# Patient Record
Sex: MALE | Race: WHITE | Employment: OTHER | URBAN - METROPOLITAN AREA
[De-identification: names, ages, dates, MRNs, and addresses within clinical notes are randomized per-mention and may not be internally consistent; named-entity substitution may affect disease eponyms.]

---

## 2017-01-06 ENCOUNTER — ANTI-COAG VISIT (OUTPATIENT)
Dept: INTERNAL MEDICINE CLINIC | Facility: CLINIC | Age: 82
End: 2017-01-06

## 2017-01-06 DIAGNOSIS — I48.20 CHRONIC ATRIAL FIBRILLATION (HCC): Primary | ICD-10-CM

## 2017-01-06 LAB — INR: 2.3 (ref 2–3)

## 2017-01-06 PROCEDURE — 36416 COLLJ CAPILLARY BLOOD SPEC: CPT

## 2017-01-06 PROCEDURE — 85610 PROTHROMBIN TIME: CPT

## 2017-02-07 ENCOUNTER — ANTI-COAG VISIT (OUTPATIENT)
Dept: INTERNAL MEDICINE CLINIC | Facility: CLINIC | Age: 82
End: 2017-02-07

## 2017-02-07 DIAGNOSIS — I48.20 CHRONIC ATRIAL FIBRILLATION (HCC): Primary | ICD-10-CM

## 2017-02-07 LAB — INR: 1.9 (ref 2–3)

## 2017-02-07 PROCEDURE — 36416 COLLJ CAPILLARY BLOOD SPEC: CPT

## 2017-02-07 PROCEDURE — G0463 HOSPITAL OUTPT CLINIC VISIT: HCPCS

## 2017-02-07 PROCEDURE — 85610 PROTHROMBIN TIME: CPT

## 2017-02-22 ENCOUNTER — OFFICE VISIT (OUTPATIENT)
Dept: FAMILY MEDICINE CLINIC | Facility: CLINIC | Age: 82
End: 2017-02-22

## 2017-02-22 VITALS
TEMPERATURE: 98 F | RESPIRATION RATE: 18 BRPM | WEIGHT: 194 LBS | BODY MASS INDEX: 27.77 KG/M2 | SYSTOLIC BLOOD PRESSURE: 133 MMHG | HEIGHT: 70 IN | HEART RATE: 71 BPM | DIASTOLIC BLOOD PRESSURE: 75 MMHG

## 2017-02-22 DIAGNOSIS — H53.9 VISUAL DISTURBANCE OF ONE EYE: ICD-10-CM

## 2017-02-22 DIAGNOSIS — S00.83XA CONTUSION OF FACE, INITIAL ENCOUNTER: ICD-10-CM

## 2017-02-22 PROCEDURE — 99213 OFFICE O/P EST LOW 20 MIN: CPT | Performed by: FAMILY MEDICINE

## 2017-02-22 PROCEDURE — G0463 HOSPITAL OUTPT CLINIC VISIT: HCPCS | Performed by: FAMILY MEDICINE

## 2017-02-22 RX ORDER — DILTIAZEM HYDROCHLORIDE 180 MG/1
180 CAPSULE, COATED, EXTENDED RELEASE ORAL
Refills: 11 | COMMUNITY
Start: 2017-01-31 | End: 2017-02-22

## 2017-02-22 NOTE — PROGRESS NOTES
HPI:    Patient ID: Karla Dacosta is a 80year old male. HPI Comments: Pt presents after a fall 5 days ago when he missed a step and slipped in his kitchen. Pt hit his face and head on the floor. No LOC but had some swelling/ bruising.  Pt had a small cu no discharge. Right conjunctiva is not injected. Left conjunctiva is not injected. Right pupil is reactive. Left pupil is reactive. Bruising of the area under the eye. No pain to palpation of the periorbital area. Scalp and head non-tender.

## 2017-03-08 ENCOUNTER — ANTI-COAG VISIT (OUTPATIENT)
Dept: INTERNAL MEDICINE CLINIC | Facility: CLINIC | Age: 82
End: 2017-03-08

## 2017-03-08 DIAGNOSIS — I48.20 CHRONIC ATRIAL FIBRILLATION (HCC): Primary | ICD-10-CM

## 2017-03-08 LAB — INR: 2.1 (ref 2–3)

## 2017-03-08 PROCEDURE — 36416 COLLJ CAPILLARY BLOOD SPEC: CPT

## 2017-03-08 PROCEDURE — 85610 PROTHROMBIN TIME: CPT

## 2017-04-06 ENCOUNTER — ANTI-COAG VISIT (OUTPATIENT)
Dept: INTERNAL MEDICINE CLINIC | Facility: CLINIC | Age: 82
End: 2017-04-06

## 2017-04-06 DIAGNOSIS — I48.20 CHRONIC ATRIAL FIBRILLATION (HCC): Primary | ICD-10-CM

## 2017-04-06 PROCEDURE — 36416 COLLJ CAPILLARY BLOOD SPEC: CPT

## 2017-04-06 PROCEDURE — 85610 PROTHROMBIN TIME: CPT

## 2017-04-06 PROCEDURE — G0463 HOSPITAL OUTPT CLINIC VISIT: HCPCS

## 2017-04-20 ENCOUNTER — ANTI-COAG VISIT (OUTPATIENT)
Dept: INTERNAL MEDICINE CLINIC | Facility: CLINIC | Age: 82
End: 2017-04-20

## 2017-04-20 DIAGNOSIS — I48.20 CHRONIC ATRIAL FIBRILLATION (HCC): Primary | ICD-10-CM

## 2017-04-20 PROCEDURE — 36416 COLLJ CAPILLARY BLOOD SPEC: CPT

## 2017-04-20 PROCEDURE — 85610 PROTHROMBIN TIME: CPT

## 2017-04-25 RX ORDER — WARFARIN SODIUM 5 MG/1
TABLET ORAL
Qty: 45 TABLET | Refills: 3 | OUTPATIENT
Start: 2017-04-25

## 2017-04-25 NOTE — TELEPHONE ENCOUNTER
·     Future Appointments       Provider Department Appt Notes    In 3 weeks [address]     In 2 months Lori Wong MD [address] 1 year f/u           Ref Range 4/20/17 11:09 AM

## 2017-05-18 ENCOUNTER — ANTI-COAG VISIT (OUTPATIENT)
Dept: INTERNAL MEDICINE CLINIC | Facility: CLINIC | Age: 82
End: 2017-05-18

## 2017-05-18 DIAGNOSIS — I48.20 CHRONIC ATRIAL FIBRILLATION (HCC): Primary | ICD-10-CM

## 2017-05-18 PROCEDURE — 85610 PROTHROMBIN TIME: CPT

## 2017-05-18 PROCEDURE — 36416 COLLJ CAPILLARY BLOOD SPEC: CPT

## 2017-05-18 PROCEDURE — G0463 HOSPITAL OUTPT CLINIC VISIT: HCPCS

## 2017-05-24 ENCOUNTER — OFFICE VISIT (OUTPATIENT)
Dept: FAMILY MEDICINE CLINIC | Facility: CLINIC | Age: 82
End: 2017-05-24

## 2017-05-24 VITALS
BODY MASS INDEX: 27.77 KG/M2 | TEMPERATURE: 98 F | RESPIRATION RATE: 18 BRPM | HEIGHT: 70 IN | DIASTOLIC BLOOD PRESSURE: 85 MMHG | HEART RATE: 69 BPM | SYSTOLIC BLOOD PRESSURE: 159 MMHG | WEIGHT: 194 LBS

## 2017-05-24 DIAGNOSIS — L29.9 ITCHING OF EAR: ICD-10-CM

## 2017-05-24 PROCEDURE — G0463 HOSPITAL OUTPT CLINIC VISIT: HCPCS | Performed by: FAMILY MEDICINE

## 2017-05-24 PROCEDURE — 99213 OFFICE O/P EST LOW 20 MIN: CPT | Performed by: FAMILY MEDICINE

## 2017-05-24 RX ORDER — CLINDAMYCIN HYDROCHLORIDE 150 MG/1
CAPSULE ORAL
Refills: 0 | Status: ON HOLD | COMMUNITY
Start: 2017-05-22 | End: 2017-06-07

## 2017-05-24 RX ORDER — DILTIAZEM HYDROCHLORIDE 180 MG/1
180 CAPSULE, COATED, EXTENDED RELEASE ORAL
Refills: 11 | Status: ON HOLD | COMMUNITY
Start: 2017-05-02 | End: 2017-06-07

## 2017-05-24 NOTE — PROGRESS NOTES
HPI:    Patient ID: Jeff Arango is a 80year old male. HPI Comments: Pt presents with ear irritation and itching for the last couple days. Pt also had has oral surgery and is on abx after his procedure. Pt has been using an ear drops for this.

## 2017-05-25 ENCOUNTER — OFFICE VISIT (OUTPATIENT)
Dept: OTOLARYNGOLOGY | Facility: CLINIC | Age: 82
End: 2017-05-25

## 2017-05-25 ENCOUNTER — TELEPHONE (OUTPATIENT)
Dept: OTOLARYNGOLOGY | Facility: CLINIC | Age: 82
End: 2017-05-25

## 2017-05-25 VITALS
HEIGHT: 70 IN | BODY MASS INDEX: 27.92 KG/M2 | DIASTOLIC BLOOD PRESSURE: 70 MMHG | TEMPERATURE: 97 F | SYSTOLIC BLOOD PRESSURE: 120 MMHG | WEIGHT: 195 LBS

## 2017-05-25 DIAGNOSIS — H61.23 BILATERAL IMPACTED CERUMEN: Primary | ICD-10-CM

## 2017-05-25 DIAGNOSIS — L29.9 EAR ITCH: ICD-10-CM

## 2017-05-25 PROCEDURE — 69210 REMOVE IMPACTED EAR WAX UNI: CPT | Performed by: OTOLARYNGOLOGY

## 2017-05-25 PROCEDURE — 99213 OFFICE O/P EST LOW 20 MIN: CPT | Performed by: OTOLARYNGOLOGY

## 2017-05-25 RX ORDER — BETAMETHASONE DIPROPIONATE 0.5 MG/G
OINTMENT TOPICAL
Qty: 1 TUBE | Refills: 0 | Status: ON HOLD | OUTPATIENT
Start: 2017-05-25 | End: 2017-06-07

## 2017-05-25 NOTE — TELEPHONE ENCOUNTER
[facility] pharmacy requesting medication clarification on: Betamethasone Dipropionate Aug 0.05 % External Ointment. Pharmacist states that the ointment is only available in the cream. Please call, thank you.          Current Outpatient Prescriptions:  Betamethaso

## 2017-05-25 NOTE — PROGRESS NOTES
Holley Flowers is a 80year old male. Patient presents with:  Ear Problem: itchy both ears for a year      HISTORY OF PRESENT ILLNESS    He presents with a history of decreased hearing and itchiness to both ears.  He was prescribed ear drops by his physicia Normal Overall appearance - Normal.   Psychiatric Normal Orientation - Oriented to time, place, person & situation. Appropriate mood and affect.    Neck Exam Normal Inspection - Normal. Palpation - Normal. Parotid gland - Normal. Thyroid gland - Normal.   E 1.5 tabs daily PO in Evening, Disp: 45 tablet, Rfl: 3  •  DiltiaZEM HCl ER Beads 180 MG Oral Capsule SR 24 Hr, Take 1 capsule (180 mg total) by mouth daily.  daily, Disp: 90 capsule, Rfl: 3  •  Atorvastatin Calcium 10 MG Oral Tab, Take 1 tablet (10 mg total

## 2017-05-25 NOTE — TELEPHONE ENCOUNTER
Pt seen 5/25 for itchy ear. Rx for Betamethasone Dipropionate Aug 0.05 % External Ointment sent to pharmacy. Per pharmacist, it is only available as a cream.  Okay to proceed w/ Rx or did you want to prescribe another med? Please advise.

## 2017-05-26 NOTE — TELEPHONE ENCOUNTER
Called pharmacy to see if ointment is available at another [facility] pharmacy. They were able to find some in stock at pt's pharmacy; they will call pt to inform him when ready for .

## 2017-06-01 ENCOUNTER — ANTI-COAG VISIT (OUTPATIENT)
Dept: INTERNAL MEDICINE CLINIC | Facility: CLINIC | Age: 82
End: 2017-06-01

## 2017-06-01 DIAGNOSIS — I48.20 CHRONIC ATRIAL FIBRILLATION (HCC): Primary | ICD-10-CM

## 2017-06-01 PROCEDURE — 85610 PROTHROMBIN TIME: CPT

## 2017-06-01 PROCEDURE — 36416 COLLJ CAPILLARY BLOOD SPEC: CPT

## 2017-06-03 ENCOUNTER — OFFICE VISIT (OUTPATIENT)
Dept: OTOLARYNGOLOGY | Facility: CLINIC | Age: 82
End: 2017-06-03

## 2017-06-03 VITALS — SYSTOLIC BLOOD PRESSURE: 150 MMHG | DIASTOLIC BLOOD PRESSURE: 90 MMHG | TEMPERATURE: 98 F

## 2017-06-03 DIAGNOSIS — H60.93 OTITIS EXTERNA OF BOTH EARS, UNSPECIFIED CHRONICITY, UNSPECIFIED TYPE: Primary | ICD-10-CM

## 2017-06-03 PROCEDURE — G0463 HOSPITAL OUTPT CLINIC VISIT: HCPCS | Performed by: OTOLARYNGOLOGY

## 2017-06-03 PROCEDURE — 99213 OFFICE O/P EST LOW 20 MIN: CPT | Performed by: OTOLARYNGOLOGY

## 2017-06-03 NOTE — PROGRESS NOTES
Gaviota Urena is a 80year old male. Patient presents with:  Ear Problem: Itching, burning sensation both ears        HISTORY OF PRESENT ILLNESS  6/3/2017   Here for evaluation of burning of both ears that woke him from sleep at 4 AM today.   He is very ups surgical history. REVIEW OF SYSTEMS    System Neg/Pos Details   Constitutional Negative fever, weight loss. ENMT Negative Headaches vertigo    Eyes Negative Blurred vision and vision changes. Respiratory Negative Dyspnea and wheezing.    Cardio Neg Capsule SR 24 Hr, Take 180 mg by mouth once daily. , Disp: , Rfl: 11  •  Warfarin Sodium (COUMADIN) 5 MG Oral Tab, TAKE AS DIRECTED BY ANTICOAGULATION CLINIC: 1.5 tabs daily PO in Evening, Disp: 45 tablet, Rfl: 3  •  DiltiaZEM HCl ER Beads 180 MG Oral Capsu

## 2017-06-05 ENCOUNTER — TELEPHONE (OUTPATIENT)
Dept: FAMILY MEDICINE CLINIC | Facility: CLINIC | Age: 82
End: 2017-06-05

## 2017-06-05 ENCOUNTER — HOSPITAL ENCOUNTER (EMERGENCY)
Facility: HOSPITAL | Age: 82
Discharge: HOME | End: 2017-06-05
Attending: EMERGENCY MEDICINE
Payer: MEDICARE

## 2017-06-05 VITALS
WEIGHT: 190 LBS | DIASTOLIC BLOOD PRESSURE: 91 MMHG | BODY MASS INDEX: 27 KG/M2 | HEART RATE: 70 BPM | OXYGEN SATURATION: 97 % | RESPIRATION RATE: 19 BRPM | SYSTOLIC BLOOD PRESSURE: 153 MMHG

## 2017-06-05 DIAGNOSIS — H53.9 VISUAL DISTURBANCE: Primary | ICD-10-CM

## 2017-06-05 PROCEDURE — 99282 EMERGENCY DEPT VISIT SF MDM: CPT

## 2017-06-05 NOTE — ED INITIAL ASSESSMENT (HPI)
C/o right eye peripheral vision loss. States started 4:55pm.  Denies weakness anywhere, speech clear. Denies any other changes in vision.   Denies headache

## 2017-06-05 NOTE — TELEPHONE ENCOUNTER
Actions Requested: Advised ER immediately and spouse will drive him to [facility] ER now.  Staff to f/u tomorrow  Situation/Background   Problem: Unable to see anything form right side of right eye    Onset: 15 mins ago   Associated Symptoms: Right side periferal

## 2017-06-05 NOTE — ED NOTES
Pt states he was watching TV and at \"4:55\" he noticed that he was unable to see \"out the sides of my right eye\". Pt states he is able to see \"straight on\", denies blurry vision. Pt aox4, moves all extremities spontaneously and equally strong.  Able to

## 2017-06-06 ENCOUNTER — OFFICE VISIT (OUTPATIENT)
Dept: FAMILY MEDICINE CLINIC | Facility: CLINIC | Age: 82
End: 2017-06-06

## 2017-06-06 ENCOUNTER — TELEPHONE (OUTPATIENT)
Dept: OTOLARYNGOLOGY | Facility: CLINIC | Age: 82
End: 2017-06-06

## 2017-06-06 VITALS
WEIGHT: 194 LBS | BODY MASS INDEX: 28 KG/M2 | DIASTOLIC BLOOD PRESSURE: 84 MMHG | TEMPERATURE: 97 F | SYSTOLIC BLOOD PRESSURE: 156 MMHG | HEART RATE: 58 BPM

## 2017-06-06 DIAGNOSIS — I10 ESSENTIAL HYPERTENSION: ICD-10-CM

## 2017-06-06 DIAGNOSIS — R42 DIZZINESS: Primary | ICD-10-CM

## 2017-06-06 DIAGNOSIS — H53.9 VISION CHANGES: ICD-10-CM

## 2017-06-06 PROCEDURE — G0463 HOSPITAL OUTPT CLINIC VISIT: HCPCS | Performed by: FAMILY MEDICINE

## 2017-06-06 PROCEDURE — 99214 OFFICE O/P EST MOD 30 MIN: CPT | Performed by: FAMILY MEDICINE

## 2017-06-06 NOTE — TELEPHONE ENCOUNTER
Per wife of pt,  Pt was in ER last night and pt is having vertigo, also having ear prob. Burning and itching.

## 2017-06-06 NOTE — TELEPHONE ENCOUNTER
S/P ED for visual disturbance will be seen by opthalmology . Stroke was also ruled out. Pt has itching and burning in ear started this in middle of night. Afebrile currently but gets hot flashes. Culture from both ears completed by Dr Janis Guerrero 6/3/17.   Cu

## 2017-06-06 NOTE — TELEPHONE ENCOUNTER
Pt wife, Cindy Pea: [phone]    requesting test results. Pt also states that pt now is experiencing vertigo. LOV: 6/03. Please advise, thank you.

## 2017-06-06 NOTE — ED PROVIDER NOTES
Patient Seen in: [facility] AND [facility] Emergency Department    History   Patient presents with: Eye Visual Problem (opthalmic)      HPI    The patient presents after developing blurriness in his right lateral vision.   He states this occurred around a half h and wound. Neurological: Negative for syncope and headaches. Constitutional and vital signs reviewed. All other systems reviewed and negative except as noted above.     PSFH elements reviewed from today and agreed except as otherwise stated in as possible for a visit in 2 days  Call tomorrow morning for an appointment on Wednesday.       Medications Prescribed:  Discharge Medication List as of 6/5/2017  7:39 PM

## 2017-06-07 ENCOUNTER — APPOINTMENT (OUTPATIENT)
Dept: MRI IMAGING | Facility: HOSPITAL | Age: 82
End: 2017-06-07
Attending: HOSPITALIST
Payer: MEDICARE

## 2017-06-07 ENCOUNTER — APPOINTMENT (OUTPATIENT)
Dept: CT IMAGING | Facility: HOSPITAL | Age: 82
End: 2017-06-07
Attending: EMERGENCY MEDICINE
Payer: MEDICARE

## 2017-06-07 ENCOUNTER — APPOINTMENT (OUTPATIENT)
Dept: ULTRASOUND IMAGING | Facility: HOSPITAL | Age: 82
End: 2017-06-07
Attending: HOSPITALIST
Payer: MEDICARE

## 2017-06-07 ENCOUNTER — HOSPITAL ENCOUNTER (OUTPATIENT)
Facility: HOSPITAL | Age: 82
Setting detail: OBSERVATION
Discharge: HOME | End: 2017-06-08
Attending: EMERGENCY MEDICINE | Admitting: HOSPITALIST
Payer: MEDICARE

## 2017-06-07 ENCOUNTER — APPOINTMENT (OUTPATIENT)
Dept: GENERAL RADIOLOGY | Facility: HOSPITAL | Age: 82
End: 2017-06-07
Attending: EMERGENCY MEDICINE
Payer: MEDICARE

## 2017-06-07 DIAGNOSIS — R42 DIZZINESS: Primary | ICD-10-CM

## 2017-06-07 DIAGNOSIS — G45.9 TRANSIENT CEREBRAL ISCHEMIA, UNSPECIFIED TYPE: ICD-10-CM

## 2017-06-07 PROCEDURE — 93880 EXTRACRANIAL BILAT STUDY: CPT | Performed by: HOSPITALIST

## 2017-06-07 PROCEDURE — 70551 MRI BRAIN STEM W/O DYE: CPT | Performed by: HOSPITALIST

## 2017-06-07 PROCEDURE — 99204 OFFICE O/P NEW MOD 45 MIN: CPT | Performed by: OTHER

## 2017-06-07 PROCEDURE — 99220 INITIAL OBSERVATION CARE,LEVL III: CPT | Performed by: HOSPITALIST

## 2017-06-07 PROCEDURE — 71010 XR CHEST AP PORTABLE  (CPT=71010): CPT | Performed by: EMERGENCY MEDICINE

## 2017-06-07 PROCEDURE — 70450 CT HEAD/BRAIN W/O DYE: CPT | Performed by: EMERGENCY MEDICINE

## 2017-06-07 RX ORDER — WARFARIN SODIUM 7.5 MG/1
7.5 TABLET ORAL AS DIRECTED
COMMUNITY
End: 2017-08-23 | Stop reason: SDUPTHER

## 2017-06-07 RX ORDER — ONDANSETRON 2 MG/ML
4 INJECTION INTRAMUSCULAR; INTRAVENOUS EVERY 6 HOURS PRN
Status: DISCONTINUED | OUTPATIENT
Start: 2017-06-07 | End: 2017-06-08

## 2017-06-07 RX ORDER — CHOLECALCIFEROL (VITAMIN D3) 125 MCG
500 CAPSULE ORAL DAILY
Status: DISCONTINUED | OUTPATIENT
Start: 2017-06-08 | End: 2017-06-07

## 2017-06-07 RX ORDER — ACETAMINOPHEN 325 MG/1
650 TABLET ORAL EVERY 6 HOURS PRN
Status: DISCONTINUED | OUTPATIENT
Start: 2017-06-07 | End: 2017-06-08

## 2017-06-07 RX ORDER — DILTIAZEM HYDROCHLORIDE 180 MG/1
180 CAPSULE, COATED, EXTENDED RELEASE ORAL DAILY
Status: DISCONTINUED | OUTPATIENT
Start: 2017-06-07 | End: 2017-06-08

## 2017-06-07 RX ORDER — CHOLECALCIFEROL (VITAMIN D3) 125 MCG
1000 CAPSULE ORAL DAILY
Status: DISCONTINUED | OUTPATIENT
Start: 2017-06-07 | End: 2017-06-08

## 2017-06-07 RX ORDER — ATORVASTATIN CALCIUM 10 MG/1
10 TABLET, FILM COATED ORAL
Status: DISCONTINUED | OUTPATIENT
Start: 2017-06-07 | End: 2017-06-08

## 2017-06-07 RX ORDER — CYANOCOBALAMIN 1000 UG/ML
1000 INJECTION INTRAMUSCULAR; SUBCUTANEOUS ONCE
Status: DISCONTINUED | OUTPATIENT
Start: 2017-06-07 | End: 2017-06-07

## 2017-06-07 RX ORDER — WARFARIN SODIUM 10 MG/1
10 TABLET ORAL AS DIRECTED
COMMUNITY
End: 2017-08-23 | Stop reason: SDUPTHER

## 2017-06-07 RX ORDER — WARFARIN SODIUM 7.5 MG/1
7.5 TABLET ORAL NIGHTLY
Status: DISCONTINUED | OUTPATIENT
Start: 2017-06-07 | End: 2017-06-08

## 2017-06-07 NOTE — PROGRESS NOTES
HPI:    Patient ID: Luis Antonio Blunt is a 80year old male.     HPI  Patient presents with:  ER F/U: f/u [facility] 6/5/16-DX: Visual Disturbance  Vertigo: c/o dizziness  Ear Problem: c/o dry ears with hearing loss   has been seeing ENT for ear discomfort without any Constitutional: He appears well-developed and well-nourished. Cardiovascular: Normal rate and regular rhythm. Pulses:       Carotid pulses are 2+ on the right side, and 2+ on the left side.   Trace ankle edema   Pulmonary/Chest: Effort normal and nataliia

## 2017-06-07 NOTE — ED INITIAL ASSESSMENT (HPI)
Zayda Winetr arrive per [facility] ambulance with c/o intermittent dizziness for few days now, with h/o vertigo, denies N/V/D/fever.

## 2017-06-07 NOTE — H&P
[address]  [facility] Patient Status:  Observation    6/3/1934 MRN [id number]   Location [facility] 3W/SW Attending Mario River MD   Hosp Day # 0 PCP Jd Church DO     Date:  2017  Date Warfarin Sodium 10 MG Oral Tab Take 10 mg by mouth As Directed. DiltiaZEM HCl ER Beads 180 MG Oral Capsule SR 24 Hr Take 1 capsule (180 mg total) by mouth daily.  daily   Atorvastatin Calcium 10 MG Oral Tab Take 1 tablet (10 mg total) by mouth once caitlin CONCLUSION:  1. Mild cardiomegaly. Tortuous aorta. 2. Moderately large cardiac hiatal hernia. 3. Biapical thickening. Minimal basilar scarring/atelectasis. No acute pulmonary consolidation.         Ekg 12-lead    6/7/2017  ECG Report  Interpretation  ------

## 2017-06-07 NOTE — ED PROVIDER NOTES
Patient Seen in: [facility] AND [facility] Emergency Department    History   Patient presents with:  Dizziness (neurologic)    Stated Complaint:     HPI    66-year-old male with history of atrial fibrillation on Coumadin as well as a history of pulmonary hyperte Other systems are as noted in HPI. Constitutional and vital signs reviewed. All other systems reviewed and negative except as noted above. PSFH elements reviewed from today and agreed except as otherwise stated in HPI.     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 Report finalized/faxed at 6:57 AM ET. Please call [facility] at [phone] (ext [phone]) if you would like to discuss the case or have questions. Lorie Maya M.D.   This report has been electronically

## 2017-06-07 NOTE — PLAN OF CARE
Problem: Patient/Family Goals  Goal: Patient/Family Long Term Goal  Patient’s Long Term Goal: To go home.     Interventions:  - Take medications as directed by your MD.  - See additional Care Plan goals for specific interventions   Outcome: Lise Díaz U/S of carotids done and showed 0-49% stenosis bilateral ICA, MRI of brain done and showed no acute issues and senescent changes, 2d echo ordered and still to be done, all needs met, bed kept low and locked, call light left within easy reach, continue to m

## 2017-06-07 NOTE — CONSULTS
[facility]    Report of Consultation    Bryce Vazquez Patient Status:  Observation    6/3/1934 MRN [id number]   Location [facility] 3W/SW Attending Indu Noriega MD   Hosp Day # 0 PCP Emma Langston,      Date of Admission Age of Onset   • Heart Disease Mother    • Heart Disease Father    • Heart Disease Sister    • Diabetes Neg    • Glaucoma Neg    • Macular degeneration Neg        Social History  Patient Guardian Status:  Not on file.     Other Topics            Concern  Ca source Oral, resp. rate 18, height 70\", weight 190 lb 1.6 oz, SpO2 100 %. .  Blood pressure is equal in both arms. There is no orthostatic change in pressure. General appearance: In no distress. His neck is supple and there is no Lhermitte sign.   CV: 04/10/2012   AST 19 08/15/2016   ALT 13* 08/15/2016   INR 2.3* 06/07/2017   PTP 24.8* 06/07/2017   TSH 1.28 06/07/2017   TROP 0.01 06/07/2017   B12 358 06/07/2017         Imaging:  Ct Brain Or Head ([id number])    6/7/2017  CONCLUSION:  1.  No acute intracranial would be unusual for it to be bilateral.  He has mild gait ataxia, but normal thyroid and B12. He is on Coumadin, and there is no evidence of any acute ischemic change on MRI.          Recommendations:  I discussed the results of my examination and reviewe

## 2017-06-08 ENCOUNTER — HOSPITAL ENCOUNTER (OUTPATIENT)
Dept: ULTRASOUND IMAGING | Facility: HOSPITAL | Age: 82
End: 2017-06-08
Attending: FAMILY MEDICINE
Payer: MEDICARE

## 2017-06-08 ENCOUNTER — APPOINTMENT (OUTPATIENT)
Dept: CV DIAGNOSTICS | Facility: HOSPITAL | Age: 82
End: 2017-06-08
Attending: HOSPITALIST
Payer: MEDICARE

## 2017-06-08 VITALS
BODY MASS INDEX: 26.75 KG/M2 | TEMPERATURE: 98 F | DIASTOLIC BLOOD PRESSURE: 72 MMHG | RESPIRATION RATE: 16 BRPM | HEIGHT: 70 IN | WEIGHT: 186.88 LBS | SYSTOLIC BLOOD PRESSURE: 135 MMHG | HEART RATE: 63 BPM | OXYGEN SATURATION: 97 %

## 2017-06-08 PROCEDURE — 99217 OBSERVATION CARE DISCHARGE: CPT | Performed by: HOSPITALIST

## 2017-06-08 PROCEDURE — 99225 SUBSEQUENT OBSERVATION CARE: CPT | Performed by: OTHER

## 2017-06-08 PROCEDURE — 93306 TTE W/DOPPLER COMPLETE: CPT | Performed by: HOSPITALIST

## 2017-06-08 RX ORDER — GABAPENTIN 300 MG/1
300 CAPSULE ORAL 2 TIMES DAILY
Qty: 60 CAPSULE | Refills: 0 | Status: SHIPPED | OUTPATIENT
Start: 2017-06-08 | End: 2017-07-11

## 2017-06-08 RX ORDER — GABAPENTIN 300 MG/1
300 CAPSULE ORAL 2 TIMES DAILY
Status: DISCONTINUED | OUTPATIENT
Start: 2017-06-08 | End: 2017-06-08

## 2017-06-08 RX ORDER — 0.9 % SODIUM CHLORIDE 0.9 %
VIAL (ML) INJECTION
Status: COMPLETED
Start: 2017-06-08 | End: 2017-06-08

## 2017-06-08 NOTE — PLAN OF CARE
NEUROLOGICAL - ADULT    • Achieves stable or improved neurological status Progressing          Patient Centered Care    • Patient preferences are identified and integrated in the patient's plan of care Progressing        No neurological change. VS stable.

## 2017-06-08 NOTE — PLAN OF CARE
Patient a/o at start of shift, woke up disoriented walked to bathroom and found in bathroom sitting on toilet, patient states does not remember getting to bathroom, when asked if he fell states no.  Patient Bed alarm placed, side rails up and belongings w/i

## 2017-06-08 NOTE — PHYSICAL THERAPY NOTE
PHYSICAL THERAPY EVALUATION - INPATIENT     Room Number: 333/333-A  Evaluation Date: 6/8/2017  Type of Evaluation: Initial  Physician Order: PT Eval and Treat    Presenting Problem:  (Dizziness, Vertigo, R/O CVA)  Reason for Therapy: Mobility Dysfuncti Pt needed cueing to slow down. Pt seemed safe after cueing provided. Pt is negative for Romberg's sign with eyes open and closed. Pt was assessed for modified fam balance test and pt scored 24/28, suggesting pt is at standard fall risk.  Pt needed one UE and was diagnosed with vertigo. For the past year he has been having trouble with his ears. He has noted hearing loss in both ears. He also has been having times where he feels a warm sensation in the ear canals, more pronounced on the left.   He was see Techniques: Activity promotion; Body mechanics;Breathing techniques;Repositioning    COGNITION  · Overall Cognitive Status:  WFL - within functional limits    RANGE OF MOTION AND STRENGTH ASSESSMENT  Upper extremity ROM and strength are within functional li training  Transfer training  Vanda vicenteg, pateint education and PT evaluation    Patient End of Session: Up in chair;Needs met;Call light within reach;RN aware of session/findings; All patient questions and concerns addressed; Family present    CURRENT GOALS

## 2017-06-08 NOTE — PROGRESS NOTES
[facility]    Progress Note    Chrissy Murillo Patient Status:  Observation    6/3/1934 MRN [id number]   Location [facility] 3W/SW Attending Doris Han MD   Hosp Day # 1 PCP Lawrence Ferguson DO       Subjective:   Heron Muller 10 mg Oral Daily   DilTIAZem HCl ER Coated Beads (CARDIZEM CD) 24 hr cap 180 mg 180 mg Oral Daily   Warfarin Sodium (COUMADIN) tab 7.5 mg 7.5 mg Oral Nightly   ondansetron HCl (ZOFRAN) injection 4 mg 4 mg Intravenous Q6H PRN   acetaminophen (TYLENOL) tab 6 (cpt=71010)    6/7/2017  CONCLUSION:  1. Mild cardiomegaly. Tortuous aorta. 2. Moderately large cardiac hiatal hernia. 3. Biapical thickening. Minimal basilar scarring/atelectasis. No acute pulmonary consolidation.         Ekg 12-lead    6/7/2017  ECG Repor

## 2017-06-08 NOTE — PLAN OF CARE
NEUROLOGICAL - ADULT    • Achieves stable or improved neurological status Completed          Patient Centered Care    • Patient preferences are identified and integrated in the patient's plan of care Completed        VS stable at discharge.  Discharge medic

## 2017-06-08 NOTE — DISCHARGE SUMMARY
[facility] - [facility]    Discharge Summary    George Nugent Patient Status:  Observation    6/3/1934 MRN [id number]   Location [facility] 3W/SW Attending Jose Francisco Hernandez MD   Hosp Day # 1 PCP Michael Sweeney DO     Date of Admission: 6 FOLLOW UP WITH [facility]   Ct Brain Or Head ([id number])    6/7/2017  CONCLUSION:  1. No acute intracranial process by noncontrast CT technique.   2. Senescent changes of parenchymal volume loss with sequela of chronic microvascular ischemic disease, including chronic LIPITOR        Take 1 tablet (10 mg total) by mouth once daily. Quantity:  90 tablet   Refills:  3       DilTIAZem HCl ER Beads 180 MG Cp24   Commonly known as:  TIAZAC        Take 1 capsule (180 mg total) by mouth daily.  daily    Quantity:  90 capsule

## 2017-06-09 ENCOUNTER — TELEPHONE (OUTPATIENT)
Dept: INTERNAL MEDICINE UNIT | Facility: HOSPITAL | Age: 82
End: 2017-06-09

## 2017-06-12 ENCOUNTER — TELEPHONE (OUTPATIENT)
Dept: NEUROLOGY | Facility: CLINIC | Age: 82
End: 2017-06-12

## 2017-06-12 NOTE — TELEPHONE ENCOUNTER
Spoke to patient's wife and notified her of below. She was understanding and very thankful for the return call.

## 2017-06-12 NOTE — TELEPHONE ENCOUNTER
I spoke with wife and patient. Patient had been evaluated at [facility]. 6/07/17 to 6/08/17, due complaints of ear discomfort and intermittent dizzyness.  Wife says patient was started on Neurontin 300mg twice per day, and \"it has helped with the itchy

## 2017-06-12 NOTE — TELEPHONE ENCOUNTER
Recommend starting PT (as it appears from prior neurology notes, dizziness was felt to be peripheral); would also advise improvement can be gradual over time

## 2017-06-13 ENCOUNTER — APPOINTMENT (OUTPATIENT)
Dept: MRI IMAGING | Facility: HOSPITAL | Age: 82
End: 2017-06-13
Attending: Other
Payer: MEDICARE

## 2017-06-13 ENCOUNTER — HOSPITAL ENCOUNTER (OUTPATIENT)
Facility: HOSPITAL | Age: 82
Setting detail: OBSERVATION
Discharge: HOME | End: 2017-06-14
Attending: EMERGENCY MEDICINE | Admitting: HOSPITALIST
Payer: MEDICARE

## 2017-06-13 ENCOUNTER — APPOINTMENT (OUTPATIENT)
Dept: CT IMAGING | Facility: HOSPITAL | Age: 82
End: 2017-06-13
Attending: EMERGENCY MEDICINE
Payer: MEDICARE

## 2017-06-13 DIAGNOSIS — R42 VERTIGO: Primary | ICD-10-CM
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
## 2017-06-13 PROCEDURE — 99214 OFFICE O/P EST MOD 30 MIN: CPT | Performed by: OTHER

## 2017-06-13 PROCEDURE — 70544 MR ANGIOGRAPHY HEAD W/O DYE: CPT | Performed by: OTHER

## 2017-06-13 PROCEDURE — 99220 INITIAL OBSERVATION CARE,LEVL III: CPT | Performed by: HOSPITALIST

## 2017-06-13 PROCEDURE — 70450 CT HEAD/BRAIN W/O DYE: CPT | Performed by: EMERGENCY MEDICINE

## 2017-06-13 RX ORDER — POLYETHYLENE GLYCOL 3350 17 G/17G
17 POWDER, FOR SOLUTION ORAL DAILY PRN
Status: DISCONTINUED | OUTPATIENT
Start: 2017-06-13 | End: 2017-06-14

## 2017-06-13 RX ORDER — CHOLECALCIFEROL (VITAMIN D3) 125 MCG
1000 CAPSULE ORAL DAILY
Status: DISCONTINUED | OUTPATIENT
Start: 2017-06-14 | End: 2017-06-14

## 2017-06-13 RX ORDER — SODIUM CHLORIDE 9 MG/ML
INJECTION, SOLUTION INTRAVENOUS CONTINUOUS
Status: DISCONTINUED | OUTPATIENT
Start: 2017-06-13 | End: 2017-06-14

## 2017-06-13 RX ORDER — MECLIZINE HYDROCHLORIDE 25 MG/1
25 TABLET ORAL 3 TIMES DAILY
Status: DISCONTINUED | OUTPATIENT
Start: 2017-06-13 | End: 2017-06-14

## 2017-06-13 RX ORDER — BISACODYL 10 MG
10 SUPPOSITORY, RECTAL RECTAL
Status: DISCONTINUED | OUTPATIENT
Start: 2017-06-13 | End: 2017-06-14

## 2017-06-13 RX ORDER — MECLIZINE HYDROCHLORIDE 25 MG/1
25 TABLET ORAL ONCE
Status: COMPLETED | OUTPATIENT
Start: 2017-06-13 | End: 2017-06-13

## 2017-06-13 RX ORDER — 0.9 % SODIUM CHLORIDE 0.9 %
VIAL (ML) INJECTION
Status: DISPENSED
Start: 2017-06-13 | End: 2017-06-14

## 2017-06-13 RX ORDER — DOCUSATE SODIUM 100 MG/1
100 CAPSULE, LIQUID FILLED ORAL 2 TIMES DAILY
Status: DISCONTINUED | OUTPATIENT
Start: 2017-06-13 | End: 2017-06-14

## 2017-06-13 RX ORDER — SODIUM PHOSPHATE, DIBASIC AND SODIUM PHOSPHATE, MONOBASIC 7; 19 G/133ML; G/133ML
1 ENEMA RECTAL ONCE AS NEEDED
Status: DISCONTINUED | OUTPATIENT
Start: 2017-06-13 | End: 2017-06-14

## 2017-06-13 RX ORDER — DILTIAZEM HYDROCHLORIDE 180 MG/1
180 CAPSULE, COATED, EXTENDED RELEASE ORAL DAILY
Status: DISCONTINUED | OUTPATIENT
Start: 2017-06-14 | End: 2017-06-14

## 2017-06-13 RX ORDER — WARFARIN SODIUM 7.5 MG/1
7.5 TABLET ORAL AS DIRECTED
COMMUNITY
End: 2017-08-23 | Stop reason: SDUPTHER

## 2017-06-13 RX ORDER — WARFARIN SODIUM 7.5 MG/1
7.5 TABLET ORAL NIGHTLY
Status: DISCONTINUED | OUTPATIENT
Start: 2017-06-13 | End: 2017-06-14

## 2017-06-13 RX ORDER — WARFARIN SODIUM 5 MG/1
10 TABLET ORAL AS DIRECTED
COMMUNITY
End: 2017-08-23 | Stop reason: SDUPTHER

## 2017-06-13 RX ORDER — GABAPENTIN 300 MG/1
300 CAPSULE ORAL 2 TIMES DAILY
Status: DISCONTINUED | OUTPATIENT
Start: 2017-06-13 | End: 2017-06-14

## 2017-06-13 RX ORDER — ONDANSETRON 2 MG/ML
4 INJECTION INTRAMUSCULAR; INTRAVENOUS EVERY 6 HOURS PRN
Status: DISCONTINUED | OUTPATIENT
Start: 2017-06-13 | End: 2017-06-14

## 2017-06-13 RX ORDER — ATORVASTATIN CALCIUM 10 MG/1
10 TABLET, FILM COATED ORAL
Status: DISCONTINUED | OUTPATIENT
Start: 2017-06-14 | End: 2017-06-14

## 2017-06-13 RX ORDER — ACETAMINOPHEN 325 MG/1
650 TABLET ORAL EVERY 6 HOURS PRN
Status: DISCONTINUED | OUTPATIENT
Start: 2017-06-13 | End: 2017-06-14

## 2017-06-13 RX ORDER — MECLIZINE HYDROCHLORIDE CHEWABLE TABLETS 25 MG/1
1 TABLET, CHEWABLE ORAL 3 TIMES DAILY PRN
Qty: 30 TABLET | Refills: 0 | Status: SHIPPED | OUTPATIENT
Start: 2017-06-13 | End: 2017-06-14

## 2017-06-13 NOTE — TELEPHONE ENCOUNTER
Wife reports that this morning, a little before 8:00am,patient got up, went to bathroom, and fell off the toilet. Wife said patient \"did not hit his head, but has an abrasion on his right arm\".  Wife said that patient has gotten back up on the toilet seat

## 2017-06-13 NOTE — ED NOTES
Assumed care of this 81 y/o male admitted for dizziness. Pt denies dizziness at this point, states he is feeling fine. Updated vital signs, noting rhythm. Left pt to rest; fall precautions maintained. Wife updated as to plan of care.

## 2017-06-13 NOTE — ED INITIAL ASSESSMENT (HPI)
Pt presents to ED via EMS after a fall at home while getting off the toilet. Pt has a hx of vertigo, states when he stood up the room was spinning. Denies LOC or hitting head. Small abrasion noted to right lower anterior arm.

## 2017-06-13 NOTE — H&P
[address] Patient Status:  Emergency    6/3/1934 MRN [id number]   Location [address] Attending Jackie Anderson MD   Hosp Day # 0 PCP Mp Dang DO     Date:   Allergies:   1-Methyl 2-Pyrrolid*        Comment:Other reaction(s): MINOCYCLINE HCL  Penicillins                 Comment:Other reaction(s): PENICILLIN G POTASSIUM    Home medications:    Please see med rec list.    Review of Systems:     + vertigo attack nonspecific complaints which worry pt such as ear burning and pulsating of jugular vein. Pt seems to have anxiety and this is likely the root of much of pt's complaints. - monitor for now  - could try valium or psych consult in future    Hx of a-fib.   -

## 2017-06-13 NOTE — ED PROVIDER NOTES
Patient Seen in: [facility] AND [facility] Emergency Department    History   Patient presents with:  Fall (musculoskeletal, neurologic)    Stated Complaint: fall    HPI    Patient presents the emergency department today complaining of falling after having an epi Smoking Status: Former Smoker                   Packs/Day: 0.00  Years:           Types: Cigarettes      Quit date: 07/28/1999    Alcohol Use: No                Review of Systems    Positive for stated complaint: fall  Other systems are as noted in HPI.   C within normal limits   CBC W/ DIFFERENTIAL - Abnormal; Notable for the following:     RBC 4.37 (*)     HCT 40.6 (*)     All other components within normal limits   BASIC METABOLIC PANEL (8) - Normal   CBC WITH DIFFERENTIAL WITH PLATELET    Narrative:     T CARLOS [place] [address]  [phone]    Schedule an appointment as soon as possible for a visit in 2 days        Medications Prescribed:  Current Discharge Medication List    START taking these medications    Meclizine HCl 25 MG Oral Chew Tab  Chew 1

## 2017-06-14 VITALS
BODY MASS INDEX: 27.49 KG/M2 | RESPIRATION RATE: 18 BRPM | DIASTOLIC BLOOD PRESSURE: 61 MMHG | HEIGHT: 70 IN | SYSTOLIC BLOOD PRESSURE: 143 MMHG | WEIGHT: 192 LBS | OXYGEN SATURATION: 99 % | HEART RATE: 63 BPM | TEMPERATURE: 98 F

## 2017-06-14 PROCEDURE — 99213 OFFICE O/P EST LOW 20 MIN: CPT | Performed by: OTHER

## 2017-06-14 PROCEDURE — 99217 OBSERVATION CARE DISCHARGE: CPT | Performed by: HOSPITALIST

## 2017-06-14 RX ORDER — MECLIZINE HYDROCHLORIDE CHEWABLE TABLETS 25 MG/1
1 TABLET, CHEWABLE ORAL 3 TIMES DAILY PRN
Qty: 30 TABLET | Refills: 1 | Status: SHIPPED | OUTPATIENT
Start: 2017-06-14 | End: 2017-07-12

## 2017-06-14 RX ORDER — MECLIZINE HYDROCHLORIDE CHEWABLE TABLETS 25 MG/1
1 TABLET, CHEWABLE ORAL 3 TIMES DAILY PRN
Qty: 30 TABLET | Refills: 1 | Status: SHIPPED | OUTPATIENT
Start: 2017-06-14 | End: 2017-06-14

## 2017-06-14 NOTE — PLAN OF CARE
Problem: NEUROLOGICAL - ADULT  Goal: Achieves stable or improved neurological status  INTERVENTIONS  - Assess for and report changes in neurological status  - Initiate measures to prevent increased intracranial pressure  - Maintain blood pressure and fluid puncture sites for bleeding and/or hematoma  - Assess quality of pulses, skin color and temperature  - Assess for signs of decreased coronary artery perfusion - ex.  Angina  - Evaluate fluid balance, assess for edema, trend weights  Outcome: Progressing  Go

## 2017-06-14 NOTE — CONSULTS
[facility] - [facility]    Report of EP Cardiology Consultation    Aubree Nalcrest Patient Status:  Observation    6/3/1934 MRN [id number]   Location [facility] Attending Ramesh Goldman MD   Hosp Day # 1 PCP J Carlos Andino,      Date of A Medical History   Diagnosis Date   • Atrial fibrillation [facility]) 2004   • Unspecified essential hypertension    • High cholesterol    • Arrhythmia    • High blood pressure        Past Surgical History      Past Surgical History    HERNIA SURGERY         Fami (1,000 mcg total) by mouth daily. DiltiaZEM HCl ER Beads 180 MG Oral Capsule SR 24 Hr Take 1 capsule (180 mg total) by mouth daily. daily   Atorvastatin Calcium 10 MG Oral Tab Take 1 tablet (10 mg total) by mouth once daily.    Warfarin Sodium 7.5 MG Oral ALB 4.40 06/08/2017   TP 6.7 06/08/2017   AST 19 08/15/2016   ALT 13* 08/15/2016   PTT 38.7* 06/13/2017   INR 2.2* 06/14/2017   PTP 24.3* 06/14/2017   TSH 1.28 06/07/2017   ESRML 17 06/08/2017   TROP 0.01 06/07/2017   B12 358 06/07/2017         Thank you

## 2017-06-14 NOTE — PHYSICAL THERAPY NOTE
PHYSICAL THERAPY EVALUATION - INPATIENT     Room Number: 510/510-A  Evaluation Date: 6/14/2017  Type of Evaluation: Initial  Physician Order: PT Eval and Treat    Presenting Problem: vertigo  Reason for Therapy: Mobility Dysfunction and Discharge Plann outpatient PT for vestibular therapy after his hospitalization. The pt will benefit form use of a RW for ambulation as he is at an increasd fall risk with his dizziness. The pt's RN was notified of his status after his session.        DISCHARGE RECOMMENDATI functional limits     Lower extremity strength is within functional limits     BALANCE  Static Sitting: Normal  Dynamic Sitting: Normal  Static Standing: Good  Dynamic Standing: Good      AM-PAC '6-Clicks' INPATIENT SHORT FORM - BASIC MOBILITY  How much di independent with walker - rolling     Goal #2  Current Status    Goal #3 Patient is able to ambulate 300 feet with assist device: walker - rolling at assistance level: modified independent   Goal #3   Current Status    Goal #4 Patient will negotiate 3 stai

## 2017-06-14 NOTE — CONSULTS
[facility]    PATIENT'S NAME: Stephon Ruizix   ATTENDING PHYSICIAN: Lester Benavides MD   CONSULTING PHYSICIAN: Carolynn Hernadez MD   PATIENT ACCOUNT#:   [id number]    LOCATION:  [address]  RECORD #:   [id number]       DATE OF BIRTH:  [birth year] reactions to minocycline, penicillin. SOCIAL HISTORY:  Does not smoke, drink alcohol, use illegal drugs.      REVIEW OF SYSTEMS:  At the present time, he denies chest pain, shortness of breath, bowel and bladder or peripheral vascular symptoms, focal serena

## 2017-06-14 NOTE — PROGRESS NOTES
[facility]    Progress Note    Marychuy Brody Patient Status:  Observation    6/3/1934 MRN [id number]   Location [address] [place] [address] Attending Suzy Melissa, [address] [place] [address] Day # 1 PCP Morgan Olvera DO       Subjective:   Marychuy Brody is complaints or deficits  HEENT: denies nasal congestion, sinus pain or sore throat; hearing loss negative  RESPIRATORY: denies shortness of breath, wheezing or cough   CARDIOVASCULAR: denies chest pain or WILSON; no palpitations   GI: denies nausea, vomiting, Staci Landry MD, MD  6/14/2017

## 2017-06-14 NOTE — PLAN OF CARE
CARDIOVASCULAR - ADULT    • Maintains optimal cardiac output and hemodynamic stability Adequate for Discharge    • Absence of cardiac arrhythmias or at baseline Adequate for Discharge        NEUROLOGICAL - ADULT    • Achieves stable or improved neurologica

## 2017-06-15 ENCOUNTER — TELEPHONE (OUTPATIENT)
Dept: INTERNAL MEDICINE UNIT | Facility: HOSPITAL | Age: 82
End: 2017-06-15

## 2017-06-15 ENCOUNTER — OFFICE VISIT (OUTPATIENT)
Dept: PHYSICAL THERAPY | Facility: HOSPITAL | Age: 82
End: 2017-06-15
Attending: HOSPITALIST
Payer: MEDICARE

## 2017-06-15 DIAGNOSIS — R42 DIZZINESS: Primary | ICD-10-CM

## 2017-06-15 PROCEDURE — 97163 PT EVAL HIGH COMPLEX 45 MIN: CPT

## 2017-06-15 PROCEDURE — 95992 CANALITH REPOSITIONING PROC: CPT

## 2017-06-15 NOTE — PROGRESS NOTES
PHYSICAL THERAPY EVALUATION:   Referring Physician: Dr. Tiara Stark   Diagnosis: Dizziness      Date of Onset: April 2017 Date of Service: 6/15/2017     PATIENT SUMMARY   Luis Antonio Blunt is a 80year old y/o male who presents to therapy today with reports of fibrillation. ASSESSMENT:      Mick Sweeney presents today with signs and symptoms of right posterior canal BPPV. He also has symptoms of significant imbalance and injurious falls which are potentially from BPPV but may be due to a combination of factors. Total Timed Treatment: 60 min     Total Treatment Time: 60 min          PLAN OF CARE:    Goals:    1. Layton-Hallpike and roll tests negative  2. No dizziness with position changes  3.   Balance and functional mobility testing WNL    Frequency / Duration: Pa

## 2017-06-15 NOTE — TELEPHONE ENCOUNTER
Patient discharged from [facility] CLIN[facility] on June 14, 2017.  Please call patient to schedule hospital follow-up appointment with PCP, .

## 2017-06-15 NOTE — DISCHARGE SUMMARY
[facility]    Discharge Summary    Aubree Columbus Patient Status:  Observation    6/3/1934 MRN [id number]   Location [facility] Attending No att. providers found   Hosp Day # 1 PCP J Carlos Andino DO     Date of Admission:  burning sensation in his ears for which he was started on gabapentin.  He c/o vertigo attacks.  He says he notes his jugular vein pulsating at times and this worries him.  He has seen neurology and says he has had \"every test in the book. \"  Pt says he ha cyanocobalamin 1000 MCG Tabs   Last time this was given:  1,000 mcg on 6/14/2017  7:55 AM        Take 1 tablet (1,000 mcg total) by mouth daily.     Quantity:  90 tablet   Refills:  0       gabapentin 300 MG Caps   Last time this was given:  300 mg on 6/ Contact information:    Geeta Mejia  [phone]          Follow up with Radha Hernandez MD. Schedule an appointment as soon as possible for a visit in 1 week.     Specialties:  Cardiovascular Diseases, CARDIOLOGY    Why:  Follow u

## 2017-06-20 ENCOUNTER — OFFICE VISIT (OUTPATIENT)
Dept: PHYSICAL THERAPY | Facility: HOSPITAL | Age: 82
End: 2017-06-20
Attending: HOSPITALIST
Payer: MEDICARE

## 2017-06-20 PROCEDURE — 95992 CANALITH REPOSITIONING PROC: CPT

## 2017-06-20 NOTE — PROGRESS NOTES
Dx: BPPV, Gait instability         Number of Visits Seen/Authorized:  2/10         Referring MD (Next MD visit): none scheduled  Precautions: fall risk  Medication Changes since last visit?: No  Subjective: Pt.  And wife reports some improvement in balance

## 2017-06-22 ENCOUNTER — OFFICE VISIT (OUTPATIENT)
Dept: PHYSICAL THERAPY | Facility: HOSPITAL | Age: 82
End: 2017-06-22
Attending: HOSPITALIST
Payer: MEDICARE

## 2017-06-22 PROCEDURE — 97112 NEUROMUSCULAR REEDUCATION: CPT

## 2017-06-22 NOTE — PROGRESS NOTES
Patient Name: Douglas Sumner : 6/3/1934, MRN: [id number]   Date:  2017  Referring Physician:  Rachana Watson    Diagnosis: Gait instability; BPPV     Discharge Summary    Pt has attended 3 visits in Physical Therapy.      Progress Note Start Da treatment time: 30 minutes     Current status G Code: Discharge, Mobility: Walking and Moving Around, CJ: 20-39% impaired, limited, or restricted  Goal status G Code:  Mobility: Walking and Moving Around, CJ: 20-39% impaired, limited, or restricted    Plan:

## 2017-06-26 ENCOUNTER — OFFICE VISIT (OUTPATIENT)
Dept: OTOLARYNGOLOGY | Facility: CLINIC | Age: 82
End: 2017-06-26

## 2017-06-26 VITALS
WEIGHT: 190 LBS | BODY MASS INDEX: 27.2 KG/M2 | HEIGHT: 70 IN | TEMPERATURE: 98 F | SYSTOLIC BLOOD PRESSURE: 130 MMHG | DIASTOLIC BLOOD PRESSURE: 70 MMHG

## 2017-06-26 DIAGNOSIS — H60.93 OTITIS EXTERNA OF BOTH EARS, UNSPECIFIED CHRONICITY, UNSPECIFIED TYPE: Primary | ICD-10-CM

## 2017-06-26 PROCEDURE — G0463 HOSPITAL OUTPT CLINIC VISIT: HCPCS | Performed by: OTOLARYNGOLOGY

## 2017-06-26 PROCEDURE — 99213 OFFICE O/P EST LOW 20 MIN: CPT | Performed by: OTOLARYNGOLOGY

## 2017-06-26 NOTE — PROGRESS NOTES
Bryce Vazquez is a 80year old male. Patient presents with:   Follow - Up: 1 month follow up- otitis externa of both ears- per pt there is some changes/improvement of symptoms        HISTORY OF PRESENT ILLNESS  6/3/2017   Here for evaluation of burning of b Disease Father    • Heart Disease Sister    • Diabetes Neg    • Glaucoma Neg    • Macular degeneration Neg      Past Medical History:   Diagnosis Date   • Arrhythmia    • Atrial fibrillation ([facility] [address] 2004   • High blood pressure    • High cholesterol    • Uns FRIDAY, Disp: , Rfl:   •  gabapentin 300 MG Oral Cap, Take 1 capsule (300 mg total) by mouth 2 (two) times daily. , Disp: 60 capsule, Rfl: 0  •  Vitamin B-12 1000 MCG Oral Tab, Take 1 tablet (1,000 mcg total) by mouth daily. , Disp: 90 tablet, Rfl: 0  •  War

## 2017-06-27 ENCOUNTER — OFFICE VISIT (OUTPATIENT)
Dept: FAMILY MEDICINE CLINIC | Facility: CLINIC | Age: 82
End: 2017-06-27

## 2017-06-27 VITALS
TEMPERATURE: 98 F | BODY MASS INDEX: 27 KG/M2 | WEIGHT: 190 LBS | DIASTOLIC BLOOD PRESSURE: 81 MMHG | SYSTOLIC BLOOD PRESSURE: 144 MMHG | HEART RATE: 74 BPM

## 2017-06-27 DIAGNOSIS — R42 DIZZINESS: Primary | ICD-10-CM

## 2017-06-27 PROCEDURE — G0463 HOSPITAL OUTPT CLINIC VISIT: HCPCS | Performed by: FAMILY MEDICINE

## 2017-06-27 PROCEDURE — 99213 OFFICE O/P EST LOW 20 MIN: CPT | Performed by: FAMILY MEDICINE

## 2017-06-27 NOTE — PROGRESS NOTES
HPI:    Patient ID: Faviola Dial is a 80year old male. HPI  Patient presents with:  Hospital F/U: f/u [facility] 6/13/17-DX: Fall and Vertigo  Vertigo has resolved with use of physical therapy  Review of Systems   Constitutional: Negative.     Respiratory: Ne Referrals:  None       #[id number]

## 2017-06-29 ENCOUNTER — APPOINTMENT (OUTPATIENT)
Dept: PHYSICAL THERAPY | Facility: HOSPITAL | Age: 82
End: 2017-06-29
Attending: HOSPITALIST
Payer: MEDICARE

## 2017-06-29 ENCOUNTER — ANTI-COAG VISIT (OUTPATIENT)
Dept: INTERNAL MEDICINE CLINIC | Facility: CLINIC | Age: 82
End: 2017-06-29

## 2017-06-29 DIAGNOSIS — I48.20 CHRONIC ATRIAL FIBRILLATION (HCC): ICD-10-CM

## 2017-06-29 PROCEDURE — 85610 PROTHROMBIN TIME: CPT

## 2017-06-29 PROCEDURE — 36416 COLLJ CAPILLARY BLOOD SPEC: CPT

## 2017-06-30 ENCOUNTER — TELEPHONE (OUTPATIENT)
Dept: OTOLARYNGOLOGY | Facility: CLINIC | Age: 82
End: 2017-06-30

## 2017-06-30 NOTE — TELEPHONE ENCOUNTER
Pt's LOV 6/26/17:  1.  Otitis externa of both ears, unspecified chronicity, unspecified type  Really improved recommend at this point no more drops to keep the ears dry by using a hair dryer twice a day if he feels improved over about a week or so that I wo

## 2017-07-03 ENCOUNTER — APPOINTMENT (OUTPATIENT)
Dept: PHYSICAL THERAPY | Facility: HOSPITAL | Age: 82
End: 2017-07-03
Attending: HOSPITALIST
Payer: MEDICARE

## 2017-07-05 ENCOUNTER — APPOINTMENT (OUTPATIENT)
Dept: PHYSICAL THERAPY | Facility: HOSPITAL | Age: 82
End: 2017-07-05
Attending: HOSPITALIST
Payer: MEDICARE

## 2017-07-10 ENCOUNTER — TELEPHONE (OUTPATIENT)
Dept: NEUROLOGY | Facility: CLINIC | Age: 82
End: 2017-07-10

## 2017-07-11 ENCOUNTER — OFFICE VISIT (OUTPATIENT)
Dept: FAMILY MEDICINE CLINIC | Facility: CLINIC | Age: 82
End: 2017-07-11

## 2017-07-11 ENCOUNTER — OFFICE VISIT (OUTPATIENT)
Dept: NEUROLOGY | Facility: CLINIC | Age: 82
End: 2017-07-11

## 2017-07-11 VITALS
HEART RATE: 72 BPM | TEMPERATURE: 98 F | SYSTOLIC BLOOD PRESSURE: 138 MMHG | DIASTOLIC BLOOD PRESSURE: 82 MMHG | OXYGEN SATURATION: 97 % | RESPIRATION RATE: 16 BRPM

## 2017-07-11 VITALS
SYSTOLIC BLOOD PRESSURE: 112 MMHG | WEIGHT: 195 LBS | RESPIRATION RATE: 16 BRPM | BODY MASS INDEX: 27.92 KG/M2 | DIASTOLIC BLOOD PRESSURE: 64 MMHG | HEART RATE: 64 BPM | HEIGHT: 70 IN

## 2017-07-11 DIAGNOSIS — R21 RASH: ICD-10-CM
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
## 2017-07-11 DIAGNOSIS — R42 VERTIGO: ICD-10-CM

## 2017-07-11 DIAGNOSIS — H92.03 EAR PAIN, BILATERAL: Primary | ICD-10-CM

## 2017-07-11 PROCEDURE — 99202 OFFICE O/P NEW SF 15 MIN: CPT | Performed by: NURSE PRACTITIONER

## 2017-07-11 PROCEDURE — 99213 OFFICE O/P EST LOW 20 MIN: CPT | Performed by: OTHER

## 2017-07-11 RX ORDER — GABAPENTIN 300 MG/1
300 CAPSULE ORAL 2 TIMES DAILY
Qty: 60 CAPSULE | Refills: 11 | Status: SHIPPED | OUTPATIENT
Start: 2017-07-11 | End: 2018-01-22

## 2017-07-11 NOTE — PROGRESS NOTES
Isa Eastman : 6/3/1934     HPI:   Patient presents with:  Hospital F/U: Patient was hospitalized on  due to dizziness. He states that he went through vestibular therapy and is doing much better. He denies any dizziness now.   Patient states that he Take 10 mg by mouth As Directed. Disp:  Rfl:    Atorvastatin Calcium 10 MG Oral Tab Take 1 tablet (10 mg total) by mouth once daily.  Disp: 90 tablet Rfl: 3   Meclizine HCl 25 MG Oral Chew Tab Chew 1 tablet by mouth 3 (three) times daily as needed (dizzines 112/64 (BP Location: Right arm, Patient Position: Sitting, Cuff Size: adult)   Pulse 64   Resp 16   Ht 70\"   Wt 195 lb   BMI 27.98 kg/m²    General appearance:  In no distress  Neuro:  Higher Integrative Functions:  Alert and cooperative, with normal atten

## 2017-07-11 NOTE — PROGRESS NOTES
CHIEF COMPLAINT:   Patient presents with:  Rash       HPI:   Douglas Sumner is a 80year old male who presents for evaluation of a rash to arms and legs x 4 days. Began after mowing the lawn. Scattered small red spots with mild localized pruritis.  No pain, Packs/day: 0.00      Years: 0.00         Types: Cigarettes     Quit date: 7/28/1999  Alcohol use: No                  REVIEW OF SYSTEMS:   GENERAL: feels well otherwise, no fever, no chills. SKIN: Per HPI. No edema. No ulcerations.   HEENT: Denies rhino The patient indicates understanding of these issues and agrees to the plan. Patient Instructions       Insect Bite  Insects most often bite to protect themselves or their nests. Certain bugs, like fleas and mosquitoes, bite to feed.  In some case · If you suspect you have insects in your home, talk to a licensed pest-control professional. He or she can inspect your home and tell you how to get rid of bugs safely. Follow-up care  Follow up with your healthcare provider, or as advised.   Call 911  Ca

## 2017-07-11 NOTE — PATIENT INSTRUCTIONS
Insect Bite  Insects most often bite to protect themselves or their nests. Certain bugs, like fleas and mosquitoes, bite to feed. In some cases, the actual bite causes no pain. An itchy red welt or swelling may develop at the site of the bite.  Most insec · If you suspect you have insects in your home, talk to a licensed pest-control professional. He or she can inspect your home and tell you how to get rid of bugs safely. Follow-up care  Follow up with your healthcare provider, or as advised.   Call 911  Ca

## 2017-07-12 ENCOUNTER — APPOINTMENT (OUTPATIENT)
Dept: PHYSICAL THERAPY | Facility: HOSPITAL | Age: 82
End: 2017-07-12
Attending: HOSPITALIST
Payer: MEDICARE

## 2017-07-14 ENCOUNTER — APPOINTMENT (OUTPATIENT)
Dept: PHYSICAL THERAPY | Facility: HOSPITAL | Age: 82
End: 2017-07-14
Attending: HOSPITALIST
Payer: MEDICARE

## 2017-07-18 ENCOUNTER — APPOINTMENT (OUTPATIENT)
Dept: PHYSICAL THERAPY | Facility: HOSPITAL | Age: 82
End: 2017-07-18
Attending: HOSPITALIST
Payer: MEDICARE

## 2017-07-27 ENCOUNTER — ANTI-COAG VISIT (OUTPATIENT)
Dept: INTERNAL MEDICINE CLINIC | Facility: CLINIC | Age: 82
End: 2017-07-27

## 2017-07-27 DIAGNOSIS — I48.20 CHRONIC ATRIAL FIBRILLATION (HCC): ICD-10-CM

## 2017-07-27 LAB — INR: 2.4 (ref 2–3)

## 2017-07-27 PROCEDURE — 85610 PROTHROMBIN TIME: CPT

## 2017-07-27 PROCEDURE — 36416 COLLJ CAPILLARY BLOOD SPEC: CPT

## 2017-08-23 ENCOUNTER — ANTI-COAG VISIT (OUTPATIENT)
Dept: INTERNAL MEDICINE CLINIC | Facility: CLINIC | Age: 82
End: 2017-08-23

## 2017-08-23 DIAGNOSIS — I48.20 CHRONIC ATRIAL FIBRILLATION (HCC): ICD-10-CM

## 2017-08-23 RX ORDER — WARFARIN SODIUM 5 MG/1
TABLET ORAL
Qty: 45 TABLET | Refills: 3 | Status: ON HOLD | OUTPATIENT
Start: 2017-08-23 | End: 2017-09-20 | Stop reason: DRUGHIGH

## 2017-08-24 ENCOUNTER — OFFICE VISIT (OUTPATIENT)
Dept: OTOLARYNGOLOGY | Facility: CLINIC | Age: 82
End: 2017-08-24

## 2017-08-24 ENCOUNTER — ANTI-COAG VISIT (OUTPATIENT)
Dept: INTERNAL MEDICINE CLINIC | Facility: CLINIC | Age: 82
End: 2017-08-24

## 2017-08-24 ENCOUNTER — TELEPHONE (OUTPATIENT)
Dept: INTERNAL MEDICINE CLINIC | Facility: CLINIC | Age: 82
End: 2017-08-24

## 2017-08-24 VITALS
HEIGHT: 70 IN | DIASTOLIC BLOOD PRESSURE: 60 MMHG | BODY MASS INDEX: 28.35 KG/M2 | TEMPERATURE: 97 F | SYSTOLIC BLOOD PRESSURE: 130 MMHG | WEIGHT: 198 LBS

## 2017-08-24 DIAGNOSIS — H61.23 BILATERAL IMPACTED CERUMEN: Primary | ICD-10-CM

## 2017-08-24 DIAGNOSIS — H60.331 ACUTE SWIMMER'S EAR OF RIGHT SIDE: ICD-10-CM

## 2017-08-24 DIAGNOSIS — I48.20 CHRONIC ATRIAL FIBRILLATION (HCC): ICD-10-CM

## 2017-08-24 LAB — INR: 1.8 (ref 2–3)

## 2017-08-24 PROCEDURE — 99214 OFFICE O/P EST MOD 30 MIN: CPT | Performed by: OTOLARYNGOLOGY

## 2017-08-24 PROCEDURE — 85610 PROTHROMBIN TIME: CPT

## 2017-08-24 PROCEDURE — 36416 COLLJ CAPILLARY BLOOD SPEC: CPT

## 2017-08-24 PROCEDURE — 69210 REMOVE IMPACTED EAR WAX UNI: CPT | Performed by: OTOLARYNGOLOGY

## 2017-08-24 RX ORDER — NEOMYCIN SULFATE, POLYMYXIN B SULFATE AND HYDROCORTISONE 10; 3.5; 1 MG/ML; MG/ML; [USP'U]/ML
3 SUSPENSION/ DROPS AURICULAR (OTIC) 3 TIMES DAILY
Qty: 10 ML | Refills: 1 | Status: SHIPPED | OUTPATIENT
Start: 2017-08-24 | End: 2017-08-31 | Stop reason: ALTCHOICE

## 2017-08-24 NOTE — TELEPHONE ENCOUNTER
Pt is taking 7.5 mg X 6 & 10 mg on Sat. Will keep on same warfarin dose and lab date. Pt understands to take 10 mg tonight and do labs in 4 weeks.  Does this sound ok?  lina

## 2017-08-24 NOTE — PROGRESS NOTES
Tressa Demarco is a 80year old male.  Patient presents with:  Ear Problem: c/o itchy and burning sensation of both ears for a year        HISTORY OF PRESENT ILLNESS  6/3/2017   Here for evaluation of burning of both ears that woke him from sleep at 4 AM tod Exercise No     Social History Narrative   None on file       Family History   Problem Relation Age of Onset   • Heart Disease Mother    • Heart Disease Father    • Heart Disease Sister    • Diabetes Neg    • Glaucoma Neg    • Macular degeneration Neg Audiogram was not done today     After informed consent was obtained, the patients ears were examined under the operating microscope. Cerumen impaction was removed from bilateral ears using suction.  Purulent drainage was removed from the right and the

## 2017-08-31 ENCOUNTER — OFFICE VISIT (OUTPATIENT)
Dept: FAMILY MEDICINE CLINIC | Facility: CLINIC | Age: 82
End: 2017-08-31

## 2017-08-31 ENCOUNTER — HOSPITAL ENCOUNTER (EMERGENCY)
Facility: HOSPITAL | Age: 82
Discharge: HOME | End: 2017-08-31
Attending: EMERGENCY MEDICINE | Admitting: FAMILY MEDICINE
Payer: MEDICARE

## 2017-08-31 VITALS
OXYGEN SATURATION: 100 % | HEART RATE: 70 BPM | RESPIRATION RATE: 18 BRPM | DIASTOLIC BLOOD PRESSURE: 90 MMHG | TEMPERATURE: 98 F | SYSTOLIC BLOOD PRESSURE: 174 MMHG

## 2017-08-31 VITALS
SYSTOLIC BLOOD PRESSURE: 147 MMHG | DIASTOLIC BLOOD PRESSURE: 84 MMHG | HEART RATE: 75 BPM | WEIGHT: 192 LBS | TEMPERATURE: 97 F | BODY MASS INDEX: 28 KG/M2

## 2017-08-31 DIAGNOSIS — H60.91 OTITIS EXTERNA OF RIGHT EAR, UNSPECIFIED CHRONICITY, UNSPECIFIED TYPE: Primary | ICD-10-CM

## 2017-08-31 DIAGNOSIS — H60.501 ACUTE OTITIS EXTERNA OF RIGHT EAR, UNSPECIFIED TYPE: Primary | ICD-10-CM

## 2017-08-31 LAB — GLUCOSE BLDC GLUCOMTR-MCNC: 81 MG/DL (ref 70–99)

## 2017-08-31 PROCEDURE — G0463 HOSPITAL OUTPT CLINIC VISIT: HCPCS | Performed by: FAMILY MEDICINE

## 2017-08-31 PROCEDURE — 99213 OFFICE O/P EST LOW 20 MIN: CPT | Performed by: FAMILY MEDICINE

## 2017-08-31 PROCEDURE — 99283 EMERGENCY DEPT VISIT LOW MDM: CPT

## 2017-08-31 PROCEDURE — 82962 GLUCOSE BLOOD TEST: CPT

## 2017-08-31 NOTE — ED INITIAL ASSESSMENT (HPI)
Patient wakes up with \"burning sensation\" to bilateral ears. Dx with right ear infection for which he's receiving abx drops. Seeing Dr. Casi Guillen.

## 2017-08-31 NOTE — ED NOTES
Pt currently being treated for right ear infection. States he woke this evening with burning sensation to both ears, denies dizziness or drainage.

## 2017-09-02 NOTE — ED PROVIDER NOTES
Patient Seen in: [facility] AND [facility] Emergency Department    History   Patient presents with:  Ear Problem Pain (neurosensory)    Stated Complaint: ear pain     HPI    55-year-old male presents the emergency department with multiple complaints.   He has had 0.00         Types: Cigarettes     Quit date: 7/28/1999  Smokeless tobacco: Never Used                      Alcohol use: No                Review of Systems    Positive for stated complaint: ear pain   Other systems are as noted in HPI.   Constitutional and behavior is normal.   Nursing note and vitals reviewed.            ED Course     Labs Reviewed   POCT GLUCOSE - Normal       ============================================================  ED Course  -----------------------------------------------------------

## 2017-09-04 ENCOUNTER — HOSPITAL ENCOUNTER (EMERGENCY)
Facility: HOSPITAL | Age: 82
Discharge: HOME | End: 2017-09-04
Attending: PHYSICIAN ASSISTANT
Payer: MEDICARE

## 2017-09-04 VITALS
RESPIRATION RATE: 16 BRPM | OXYGEN SATURATION: 100 % | DIASTOLIC BLOOD PRESSURE: 100 MMHG | HEART RATE: 64 BPM | WEIGHT: 189 LBS | TEMPERATURE: 98 F | SYSTOLIC BLOOD PRESSURE: 172 MMHG | BODY MASS INDEX: 27.06 KG/M2 | HEIGHT: 70 IN

## 2017-09-04 DIAGNOSIS — R03.0 ELEVATED BLOOD PRESSURE READING: ICD-10-CM

## 2017-09-04 DIAGNOSIS — H60.91 OTITIS EXTERNA OF RIGHT EAR, UNSPECIFIED CHRONICITY, UNSPECIFIED TYPE: Primary | ICD-10-CM

## 2017-09-04 PROCEDURE — 99282 EMERGENCY DEPT VISIT SF MDM: CPT

## 2017-09-04 NOTE — ED PROVIDER NOTES
Patient Seen in: [facility] AND [facility] Emergency Department    History   Patient presents with:  Ear Problem Pain (neurosensory)    Stated Complaint: R ear infection    HPI    80-year-old male presents with chief complaint of right otorrhea.   Onset two weeks (1,000 mcg total) by mouth daily.        Family History   Problem Relation Age of Onset   • Heart Disease Mother    • Heart Disease Father    • Heart Disease Sister    • Diabetes Neg    • Glaucoma Neg    • Macular degeneration Neg        Smoking status: For no stridor. Air entry is equal.  Cardiovascular: Irregularly irregular rhythm, no murmurs, gallops, rubs. Capillary refill is brisk. No extremity edema. Gastrointestinal: Abdomen soft, nontender, nondistended.   There is no rebound tenderness or guardin

## 2017-09-04 NOTE — ED INITIAL ASSESSMENT (HPI)
Pt w/ right sided ear infection x2 weeks. Was started on an antibiotic but it was changed to ciprofloxacin-hydrocortisone on Thursday by dr. Valeriano Cox. Pt has an appointment for an ENT on Thursday but pt does not want to wait for appointment.

## 2017-09-04 NOTE — ED NOTES
Pt and wife made note of blood pressure and stated they would follow up with their family doctor. Discussed use of ear drops at home and method of instilling them.

## 2017-09-06 NOTE — PROGRESS NOTES
HPI:    Patient ID: Georgi Espinoza is a 80year old male.     HPI  Patient presents with:  ER F/U: f/u [facility] E.R-DX: right ear infection  Bite Sting,Insect (integumentary): pt thinks he got bit by bugs, has bites all over body    Review of Systems   Constituti

## 2017-09-07 ENCOUNTER — OFFICE VISIT (OUTPATIENT)
Dept: OTOLARYNGOLOGY | Facility: CLINIC | Age: 82
End: 2017-09-07

## 2017-09-07 ENCOUNTER — OFFICE VISIT (OUTPATIENT)
Dept: FAMILY MEDICINE CLINIC | Facility: CLINIC | Age: 82
End: 2017-09-07

## 2017-09-07 VITALS
BODY MASS INDEX: 27.2 KG/M2 | WEIGHT: 190 LBS | HEIGHT: 70 IN | DIASTOLIC BLOOD PRESSURE: 76 MMHG | SYSTOLIC BLOOD PRESSURE: 140 MMHG

## 2017-09-07 VITALS
SYSTOLIC BLOOD PRESSURE: 124 MMHG | WEIGHT: 190 LBS | BODY MASS INDEX: 27 KG/M2 | DIASTOLIC BLOOD PRESSURE: 84 MMHG | HEART RATE: 73 BPM

## 2017-09-07 DIAGNOSIS — H60.501 ACUTE OTITIS EXTERNA OF RIGHT EAR, UNSPECIFIED TYPE: Primary | ICD-10-CM

## 2017-09-07 DIAGNOSIS — R41.0 CONFUSION: ICD-10-CM

## 2017-09-07 DIAGNOSIS — I10 ESSENTIAL HYPERTENSION: ICD-10-CM

## 2017-09-07 DIAGNOSIS — R20.8 BURNING SENSATION: Primary | ICD-10-CM

## 2017-09-07 DIAGNOSIS — F41.9 ANXIETY: ICD-10-CM

## 2017-09-07 DIAGNOSIS — L30.9 DERMATITIS: ICD-10-CM

## 2017-09-07 DIAGNOSIS — H61.20 WAX IN EAR: ICD-10-CM

## 2017-09-07 DIAGNOSIS — H53.9 VISION CHANGES: ICD-10-CM

## 2017-09-07 PROCEDURE — 99214 OFFICE O/P EST MOD 30 MIN: CPT | Performed by: FAMILY MEDICINE

## 2017-09-07 PROCEDURE — 69210 REMOVE IMPACTED EAR WAX UNI: CPT | Performed by: OTOLARYNGOLOGY

## 2017-09-07 PROCEDURE — G0463 HOSPITAL OUTPT CLINIC VISIT: HCPCS | Performed by: FAMILY MEDICINE

## 2017-09-07 PROCEDURE — 99213 OFFICE O/P EST LOW 20 MIN: CPT | Performed by: OTOLARYNGOLOGY

## 2017-09-07 NOTE — PROGRESS NOTES
HPI:    Patient ID: Mary Perez is a 80year old male.     HPI  Patient presents with:  ER F/U: f/u [address] [place] [address] 9/4/17-DX:Treatment team Otitis externa of right ear  Rash: red, itchy rash on left collar bone  Eval-P (psychiatric): pt states he has been having suzan Acute otitis externa of right ear, unspecified type  (primary encounter diagnosis)  Essential hypertension  Confusion  Dermatitis  Vision changes  Anxiety  Is being referred to specialty ENT because no abnormality matches his complaints.   Rash with new rosario

## 2017-09-07 NOTE — PROGRESS NOTES
Mary Perez is a 80year old male. Patient presents with:  Ear Problem: bilateral burning ,rt ear infection per ER Dr ,started patient on a new antibiotic stating other was not effective .  Patient feels liquid in ears   Eval-P (psychiatric): patient stat his ears and also feeling of moisture. He also notes that it is hard for him to focus to hear he has ringing he has imbalance and overall has a lot of anxiety about what is going on with his ears.   He feels like the ears of the problem that is ruining his bruising.      PHYSICAL EXAM    /76 (BP Location: Left arm, Patient Position: Sitting, Cuff Size: adult)   Ht 5' 10\" (1.778 m)   Wt 190 lb (86.2 kg)   BMI 27.26 kg/m²     System Findings Details   Skin Normal Inspection - Normal. No suspicious lesion Tab, Take 1 tablet (1,000 mcg total) by mouth daily. , Disp: 90 tablet, Rfl: 0  •  Warfarin Sodium 5 MG Oral Tab, TAKE AS DIRECTED BY ANTICOAGULATION CLINIC : 1.5 tabs, PO in Evening. djcharo, Disp: 45 tablet, Rfl: 3    ASSESSMENT AND PLAN  1.  Resolved  Otitis

## 2017-09-11 ENCOUNTER — TELEPHONE (OUTPATIENT)
Dept: OTOLARYNGOLOGY | Facility: CLINIC | Age: 82
End: 2017-09-11

## 2017-09-11 NOTE — TELEPHONE ENCOUNTER
Pt was referred to for Dr Fiona Poole - has appt on 10/9- they are asking for records to be faxed over - pls fax to [phone]

## 2017-09-14 ENCOUNTER — OFFICE VISIT (OUTPATIENT)
Dept: DERMATOLOGY CLINIC | Facility: CLINIC | Age: 82
End: 2017-09-14

## 2017-09-14 DIAGNOSIS — L13.9 BULLOUS DERMATOSIS: ICD-10-CM

## 2017-09-14 DIAGNOSIS — L25.9 CONTACT DERMATITIS AND ECZEMA: Primary | ICD-10-CM

## 2017-09-14 DIAGNOSIS — D23.9 BENIGN NEOPLASM OF SKIN, UNSPECIFIED LOCATION: ICD-10-CM

## 2017-09-14 PROCEDURE — 88350 IMFLUOR EA ADDL 1ANTB STN PX: CPT | Performed by: DERMATOLOGY

## 2017-09-14 PROCEDURE — 88346 IMFLUOR 1ST 1ANTB STAIN PX: CPT | Performed by: DERMATOLOGY

## 2017-09-14 PROCEDURE — 11101 BIOPSY, EACH ADDED LESION: CPT | Performed by: DERMATOLOGY

## 2017-09-14 PROCEDURE — 99202 OFFICE O/P NEW SF 15 MIN: CPT | Performed by: DERMATOLOGY

## 2017-09-14 PROCEDURE — 88305 TISSUE EXAM BY PATHOLOGIST: CPT | Performed by: DERMATOLOGY

## 2017-09-14 PROCEDURE — 11100 BIOPSY OF SKIN LESION: CPT | Performed by: DERMATOLOGY

## 2017-09-14 RX ORDER — CLOBETASOL PROPIONATE 0.5 MG/G
1 CREAM TOPICAL 2 TIMES DAILY
Qty: 60 G | Refills: 2 | Status: SHIPPED | OUTPATIENT
Start: 2017-09-14 | End: 2018-09-14

## 2017-09-18 ENCOUNTER — TELEPHONE (OUTPATIENT)
Dept: DERMATOLOGY CLINIC | Facility: CLINIC | Age: 82
End: 2017-09-18

## 2017-09-18 NOTE — TELEPHONE ENCOUNTER
Spoke with pt and wife. Directions received were for biopsy with a stitch, wash daily. Instructions reviewed and reinforced. Verbalized understanding and denied further questions.

## 2017-09-20 ENCOUNTER — APPOINTMENT (OUTPATIENT)
Dept: CT IMAGING | Facility: HOSPITAL | Age: 82
End: 2017-09-20
Attending: EMERGENCY MEDICINE
Payer: MEDICARE

## 2017-09-20 ENCOUNTER — HOSPITAL ENCOUNTER (OUTPATIENT)
Facility: HOSPITAL | Age: 82
Setting detail: OBSERVATION
LOS: 1 days | Discharge: HOME | End: 2017-09-21
Attending: EMERGENCY MEDICINE | Admitting: HOSPITALIST
Payer: MEDICARE

## 2017-09-20 ENCOUNTER — APPOINTMENT (OUTPATIENT)
Dept: CV DIAGNOSTICS | Facility: HOSPITAL | Age: 82
End: 2017-09-20
Attending: HOSPITALIST
Payer: MEDICARE

## 2017-09-20 ENCOUNTER — APPOINTMENT (OUTPATIENT)
Dept: GENERAL RADIOLOGY | Facility: HOSPITAL | Age: 82
End: 2017-09-20
Attending: EMERGENCY MEDICINE
Payer: MEDICARE

## 2017-09-20 DIAGNOSIS — R42 VERTIGO: Primary | ICD-10-CM

## 2017-09-20 DIAGNOSIS — S09.90XA INJURY OF HEAD, INITIAL ENCOUNTER: ICD-10-CM

## 2017-09-20 DIAGNOSIS — H92.01 RIGHT EAR PAIN: ICD-10-CM

## 2017-09-20 DIAGNOSIS — S00.03XA CONTUSION OF SCALP, INITIAL ENCOUNTER: ICD-10-CM

## 2017-09-20 PROCEDURE — 70450 CT HEAD/BRAIN W/O DYE: CPT | Performed by: EMERGENCY MEDICINE

## 2017-09-20 PROCEDURE — 72125 CT NECK SPINE W/O DYE: CPT | Performed by: EMERGENCY MEDICINE

## 2017-09-20 PROCEDURE — 99223 1ST HOSP IP/OBS HIGH 75: CPT | Performed by: OTHER

## 2017-09-20 PROCEDURE — 71010 XR CHEST AP PORTABLE  (CPT=71010): CPT | Performed by: EMERGENCY MEDICINE

## 2017-09-20 PROCEDURE — 99220 INITIAL OBSERVATION CARE,LEVL III: CPT | Performed by: HOSPITALIST

## 2017-09-20 RX ORDER — CHOLECALCIFEROL (VITAMIN D3) 125 MCG
1000 CAPSULE ORAL DAILY
Status: DISCONTINUED | OUTPATIENT
Start: 2017-09-21 | End: 2017-09-21

## 2017-09-20 RX ORDER — WARFARIN SODIUM 5 MG/1
7.5 TABLET ORAL NIGHTLY
COMMUNITY
End: 2017-10-26

## 2017-09-20 RX ORDER — ACETAMINOPHEN 325 MG/1
650 TABLET ORAL EVERY 4 HOURS PRN
Status: DISCONTINUED | OUTPATIENT
Start: 2017-09-20 | End: 2017-09-21

## 2017-09-20 RX ORDER — CLOBETASOL PROPIONATE 0.5 MG/G
1 OINTMENT TOPICAL 2 TIMES DAILY
Status: DISCONTINUED | OUTPATIENT
Start: 2017-09-20 | End: 2017-09-21

## 2017-09-20 RX ORDER — HYDROCODONE BITARTRATE AND ACETAMINOPHEN 5; 325 MG/1; MG/1
2 TABLET ORAL EVERY 4 HOURS PRN
Status: DISCONTINUED | OUTPATIENT
Start: 2017-09-20 | End: 2017-09-21

## 2017-09-20 RX ORDER — WARFARIN SODIUM 5 MG/1
5 TABLET ORAL NIGHTLY
Status: DISCONTINUED | OUTPATIENT
Start: 2017-09-20 | End: 2017-09-20

## 2017-09-20 RX ORDER — ATORVASTATIN CALCIUM 10 MG/1
10 TABLET, FILM COATED ORAL
Status: DISCONTINUED | OUTPATIENT
Start: 2017-09-21 | End: 2017-09-21

## 2017-09-20 RX ORDER — ACETAMINOPHEN 325 MG/1
650 TABLET ORAL EVERY 6 HOURS PRN
Status: DISCONTINUED | OUTPATIENT
Start: 2017-09-20 | End: 2017-09-21

## 2017-09-20 RX ORDER — SODIUM CHLORIDE 9 MG/ML
INJECTION, SOLUTION INTRAVENOUS CONTINUOUS
Status: DISCONTINUED | OUTPATIENT
Start: 2017-09-20 | End: 2017-09-21

## 2017-09-20 RX ORDER — SODIUM CHLORIDE 0.9 % (FLUSH) 0.9 %
3 SYRINGE (ML) INJECTION AS NEEDED
Status: DISCONTINUED | OUTPATIENT
Start: 2017-09-20 | End: 2017-09-21

## 2017-09-20 RX ORDER — HEPARIN SODIUM 5000 [USP'U]/ML
5000 INJECTION, SOLUTION INTRAVENOUS; SUBCUTANEOUS EVERY 8 HOURS SCHEDULED
Status: DISCONTINUED | OUTPATIENT
Start: 2017-09-20 | End: 2017-09-20

## 2017-09-20 RX ORDER — MECLIZINE HCL 12.5 MG/1
12.5 TABLET ORAL 3 TIMES DAILY PRN
Status: DISCONTINUED | OUTPATIENT
Start: 2017-09-20 | End: 2017-09-21

## 2017-09-20 RX ORDER — WARFARIN SODIUM 5 MG/1
10 TABLET ORAL NIGHTLY
COMMUNITY
End: 2017-10-26

## 2017-09-20 RX ORDER — GABAPENTIN 300 MG/1
300 CAPSULE ORAL 2 TIMES DAILY
Status: DISCONTINUED | OUTPATIENT
Start: 2017-09-20 | End: 2017-09-21

## 2017-09-20 RX ORDER — HYDROCODONE BITARTRATE AND ACETAMINOPHEN 5; 325 MG/1; MG/1
1 TABLET ORAL EVERY 4 HOURS PRN
Status: DISCONTINUED | OUTPATIENT
Start: 2017-09-20 | End: 2017-09-21

## 2017-09-20 RX ORDER — WARFARIN SODIUM 7.5 MG/1
7.5 TABLET ORAL ONCE
Status: COMPLETED | OUTPATIENT
Start: 2017-09-20 | End: 2017-09-20

## 2017-09-20 NOTE — ED INITIAL ASSESSMENT (HPI)
Pt from home, hx of vertigo, felt dizzy this AM and fell.  Pt hit his head and is on coumadin for afib

## 2017-09-20 NOTE — ED PROVIDER NOTES
[facility] Se[facility] Emergency Department    History   Patient presents with:  Fall (musculoskeletal, neurologic)    Stated Complaint: vertigo - fell and hit head thinks he has R ear infection    HPI    79-year-old male with history of atri infection  Other systems are as noted in HPI. Constitutional and vital signs reviewed. All other systems reviewed and negative except as noted above. PSFH elements reviewed from today and agreed except as otherwise stated in HPI.     Physical Exam All other components within normal limits   PROTHROMBIN TIME (PT) - Abnormal; Notable for the following:     PT 25.5 (*)     INR 2.4 (*)     All other components within normal limits   CBC W/ DIFFERENTIAL - Abnormal; Notable for the following:     RBC 4.39

## 2017-09-20 NOTE — PLAN OF CARE
Problem: Patient/Family Goals  Goal: Patient/Family Long Term Goal  Patient's Long Term Goal: to improve overall health    Interventions:  - medication management  -doctors visits  - See additional Care Plan goals for specific interventions    Outcome: Pro

## 2017-09-20 NOTE — CONSULTS
[facility] HOSP - [facility]    Report of Consultation    Chucky Tran Patient Status:  Observation    6/3/1934 MRN [id number]   Location [facility]5W Attending Laurie Anderson MD   Hosp Day # 0 PCP Sayda Alston DO     Date of Admission:   History:   Diagnosis Date   • Arrhythmia    • Atrial fibrillation ([facility] [address] 2004   • High blood pressure    • High cholesterol    • Hyperlipidemia    • Unspecified essential hypertension        Past Surgical History  Past Surgical History:  No date: HERNIA CHLOE DAILY. gabapentin 300 MG Oral Cap Take 1 capsule (300 mg total) by mouth 2 (two) times daily. Vitamin B-12 1000 MCG Oral Tab Take 1 tablet (1,000 mcg total) by mouth daily.        Allergies    1-Methyl 2-Pyrrolid*        Comment:Other reaction(s): [facility] midline, without atrophy. Motor: 5/5 strength in the upper and lower extremities. Tone normal. No pronator drift . Sensory: Intact to Vibration, temperature, fine touch, proprioception and pin prick in the UE and LE.  Stereognosis intact  DTR: 2+ in has been ordered to rule out any new abnormalities in the posterior fossa. Sed rate would be suggested because of the rash, although sed rate and DEWAYNE were both normal in June. Consultation with physical therapy would be helpful for vestibular exercises.

## 2017-09-20 NOTE — ED NOTES
Pt arrives to ed35 from home s/p falling at home. Pt stated he woke up w/ vertigo, was going to the bathroom and fell. Pt hit his head w/ hematoma to left side of his head. Pt on coumadin for afib.  Pt states he has an ear infection in right ear, he has fin

## 2017-09-20 NOTE — H&P
[id number]  [address] Patient Status:  Observation    6/3/1934 MRN [id number]   Location [facility] 1SW Attending Caroline Silver MD   Hosp Day # 0 PCP Lalo Malik,      Date:  2017  Date of MINOCYCLINE HCL  Penicillins                 Comment:Other reaction(s): PENICILLIN G POTASSIUM    Prescriptions Prior to Admission:  Warfarin Sodium 5 MG Oral Tab Take 75 mg by mouth nightly.  Sun thru fri   Warfarin Sodium 5 MG Oral Tab Take 10 mg by mouth edema, clubbing or cyanosis  NEUROLOGICAL:  There was no focal deficit. Cranial nerves II through XII were intact.       Results:     Lab Results  Component Value Date   WBC 5.9 09/20/2017   HGB 13.9 09/20/2017   HCT 40.9 (L) 09/20/2017    09/20/201 negative for bleed  Supportive care for now   Pain control      Contusion of scalp, initial encounter  See above       Right ear pain  Follow up ENT as an outpatient  MRI pending  Neurology consulted     History of Afib  - resume coumadin   - INR daily

## 2017-09-21 ENCOUNTER — APPOINTMENT (OUTPATIENT)
Dept: MRI IMAGING | Facility: HOSPITAL | Age: 82
End: 2017-09-21
Attending: HOSPITALIST
Payer: MEDICARE

## 2017-09-21 ENCOUNTER — TELEPHONE (OUTPATIENT)
Dept: FAMILY MEDICINE CLINIC | Facility: CLINIC | Age: 82
End: 2017-09-21

## 2017-09-21 ENCOUNTER — APPOINTMENT (OUTPATIENT)
Dept: CV DIAGNOSTICS | Facility: HOSPITAL | Age: 82
End: 2017-09-21
Attending: HOSPITALIST
Payer: MEDICARE

## 2017-09-21 VITALS
DIASTOLIC BLOOD PRESSURE: 80 MMHG | HEIGHT: 70 IN | WEIGHT: 189.5 LBS | RESPIRATION RATE: 16 BRPM | OXYGEN SATURATION: 100 % | HEART RATE: 54 BPM | SYSTOLIC BLOOD PRESSURE: 147 MMHG | BODY MASS INDEX: 27.13 KG/M2 | TEMPERATURE: 98 F

## 2017-09-21 PROCEDURE — 93306 TTE W/DOPPLER COMPLETE: CPT | Performed by: HOSPITALIST

## 2017-09-21 PROCEDURE — 70551 MRI BRAIN STEM W/O DYE: CPT | Performed by: HOSPITALIST

## 2017-09-21 PROCEDURE — 99232 SBSQ HOSP IP/OBS MODERATE 35: CPT | Performed by: OTHER

## 2017-09-21 PROCEDURE — 99217 OBSERVATION CARE DISCHARGE: CPT | Performed by: HOSPITALIST

## 2017-09-21 RX ORDER — 0.9 % SODIUM CHLORIDE 0.9 %
VIAL (ML) INJECTION
Status: COMPLETED
Start: 2017-09-21 | End: 2017-09-21

## 2017-09-21 NOTE — TELEPHONE ENCOUNTER
Spouse cancelled the appt for the patient today. Per spouse pt is at [address] [facility] and his INR taken yesterday morning and would like to give Butch Dukes the results.  Patient's INR is 2.4 this was done yesterday in the ER room at the hospital.

## 2017-09-21 NOTE — DISCHARGE SUMMARY
[facility]    Discharge Summary    S[place]ma Hills Patient Status:  Observation    6/3/1934 MRN [id number]   Location [facility]5W Attending Shruthi Dunne MD   Hosp Day # 1 PCP Barrington Yoo DO     Date of Admission: 2017 Exam:    General: No acute distress. Respiratory: Clear to auscultation bilaterally. No wheezes. No rhonchi. Cardiovascular: S1, S2. Regular rate and rhythm. No murmurs, rubs or gallops. Abdomen: Soft, nontender, nondistended. Positive bowel sounds. Discharge Activity: As tolerated       Discharge Medications      CONTINUE taking these medications      Instructions Prescription details   atorvastatin 10 MG Tabs  Commonly known as:  LIPITOR      TAKE 1 TABLET (10 MG TOTAL) BY MOUTH ONCE DAILY.    Brooklyn Castelan

## 2017-09-21 NOTE — PLAN OF CARE
NEUROLOGICAL - ADULT    • Achieves stable or improved neurological status Progressing    • Achieves maximal functionality and self care Progressing        Patient/Family Goals    • Patient/Family Long Term Goal Progressing    • Patient/Family Short Term Go

## 2017-09-21 NOTE — CM/SW NOTE
Kristina Flowers, 09/21/17, 4:45 PM  Patient failed inpatient criteria. Second level of review completed and supports observation. UR committee in agreement. Discussed with Dr. Lilliam Barlow and Dr. Kian Hernández who approves observation status.  Observation letter give

## 2017-09-21 NOTE — PHYSICAL THERAPY NOTE
PHYSICAL THERAPY QUICK EVALUATION - INPATIENT    Room Number: 091/115-T  Evaluation Date: 9/21/2017  Presenting Problem: vertigo  Physician Order: PT Eval and Treat    Problem List  Principal Problem:    Vertigo  Active Problems:    Injury of head, initi Standardized Score (AM-PAC Scale): 56.93   CMS Modifier (G-Code): CI      FUNCTIONAL ABILITY STATUS  Gait Assessment  Gait Assistance: Supervision  Distance (ft): 130  Assistive Device: None  Pattern:  (narrow ORACIO, occasional scissoring)  Stoop/Curb Assi taught vestibular exercises. Pt will be d/c from IP PT services. Pt is at his baseline. Pt does not present with functional mobility or vestibular PT needs. Recommend home w/ family support as needed.  Educated pt and his wife to seek OPPT for vestibular

## 2017-09-21 NOTE — PROGRESS NOTES
The pathology report from last visit showed   Spongiotic dermatitis--Dif negative. No evidence of BP, should be improving. Please get update on how pt is doing. Please log in test results. Please call patient and inform of results and recommendations.

## 2017-09-21 NOTE — PROGRESS NOTES
[facility]    Progress Note    Mika Stanton Patient Status:  Inpatient    6/3/1934 MRN [id number]   Location [facility]5W Attending Vanna Delarosa MD   Hosp Day # 1 PCP Karen Reddy DO       Subjective:   Mika Stanton is a( Q6H PRN   acetaminophen (TYLENOL) tab 650 mg 650 mg Oral Q4H PRN   Or      HYDROcodone-acetaminophen (NORCO) 5-325 MG per tab 1 tablet 1 tablet Oral Q4H PRN   Or      HYDROcodone-acetaminophen (NORCO) 5-325 MG per tab 2 tablet 2 tablet Oral Q4H PRN   Breonnali Ekg 12-lead    Result Date: 9/20/2017  ECG Report  Interpretation  -------------------------- atrial fibrillation ABNORMAL RHYTHM When compared with ECG of 06/07/2017 04:56:24 No change Electronically signed on 09/20/2017 at 16:25 by Sanford Marcelino MD

## 2017-09-22 ENCOUNTER — PATIENT OUTREACH (OUTPATIENT)
Dept: FAMILY MEDICINE CLINIC | Facility: CLINIC | Age: 82
End: 2017-09-22

## 2017-09-22 ENCOUNTER — TELEPHONE (OUTPATIENT)
Dept: INTERNAL MEDICINE UNIT | Facility: HOSPITAL | Age: 82
End: 2017-09-22

## 2017-09-22 DIAGNOSIS — I48.91 ATRIAL FIBRILLATION (HCC): Primary | ICD-10-CM

## 2017-09-22 NOTE — TELEPHONE ENCOUNTER
HOSPITALIST NURSE TELEPHONE NOTE    Per Dr Eamon Cantrell pt to have follow up INR drawn tomorrow. Coumadin Clinic Closed on Saturdays and no available appts for today. Pts wife notified to follow up at outpt lab tomorrow 9/23/17. Order for lab draw entered.  Resul

## 2017-09-22 NOTE — PROGRESS NOTES
Pt contacted. Verbalized understanding of path results. Pt will be in office on Thursday for suture removal.    FYI pt's wife states that her  is 90% better.

## 2017-09-22 NOTE — CM/SW NOTE
Mele Cerna, 09/22/17, 9:11 AM  Pt's wife, Eric Torres call regarding the OBS and MOON forms. She indicated her  signed  the date of October 21 instead of Sept. 21.  Informed we will make note and it should not matter in the long run.

## 2017-09-22 NOTE — PROGRESS NOTES
Initial Post Discharge Follow Up   Discharge Date: 9/21/17  Contact Date: 9/22/2017    Consent Verification:  Assessment Completed With: Spouse: Eleele Bi received per patient?  written  HIPAA Verified? Yes    1.  Tell me why you were in the hos hospitalization? Yes    12. In need of referral for:  N/A    13.  Do you have a follow up visit scheduled with your Primary Care Physician or Specialist?    Your appointments     Date & Time Appointment Department [facility])    Sep 26, 2017  2:00 PM CDT [facility] Wife made aware Coumadin Clinic will be closed, and they should follow up at outpatient lab tomorrow 9/23/17. Pt is a high risk for readmission and has follow up apt with Dr Melinda Adam on 9/26/17 for TCM follow up.      Referral/Contact:  GIORGIO ALVARES

## 2017-09-23 ENCOUNTER — APPOINTMENT (OUTPATIENT)
Dept: LAB | Facility: HOSPITAL | Age: 82
End: 2017-09-23
Attending: HOSPITALIST
Payer: MEDICARE

## 2017-09-23 DIAGNOSIS — I48.91 ATRIAL FIBRILLATION (HCC): ICD-10-CM

## 2017-09-23 LAB
INR BLD: 2.3 (ref 0.9–1.2)
PROTHROMBIN TIME: 25 SECONDS (ref 11.8–14.5)

## 2017-09-23 PROCEDURE — 36415 COLL VENOUS BLD VENIPUNCTURE: CPT

## 2017-09-23 PROCEDURE — 85610 PROTHROMBIN TIME: CPT

## 2017-09-24 ENCOUNTER — PATIENT MESSAGE (OUTPATIENT)
Dept: FAMILY MEDICINE CLINIC | Facility: CLINIC | Age: 82
End: 2017-09-24

## 2017-09-25 ENCOUNTER — NURSE TRIAGE (OUTPATIENT)
Dept: OTHER | Age: 82
End: 2017-09-25

## 2017-09-25 ENCOUNTER — ANTI-COAG VISIT (OUTPATIENT)
Dept: INTERNAL MEDICINE CLINIC | Facility: CLINIC | Age: 82
End: 2017-09-25

## 2017-09-25 DIAGNOSIS — I48.20 CHRONIC ATRIAL FIBRILLATION (HCC): ICD-10-CM

## 2017-09-25 NOTE — PROGRESS NOTES
Marychuy Brody is a 80year old male. Patient presents with:  Rash: New pt presents with multiple pruritic lesions to trunk, bilateral arms, groin, and upper legs x3 weeks. Worse at night Dr. Ana Paula Walsh recommended derm visit.  Tried benadryl anti itch lot B-12 1000 MCG Oral Tab Take 1 tablet (1,000 mcg total) by mouth daily. Disp: 90 tablet Rfl: 0   Warfarin Sodium 5 MG Oral Tab Take 7.5 mg by mouth nightly. Sun thru fri   Disp:  Rfl:    Warfarin Sodium 5 MG Oral Tab Take 10 mg by mouth nightly.  sat Disp: photosensitivity, lymph node swelling. No other skin complaints. History, medications, allergies as noted. Nothing new or different no unusual exposures. No changes in soaps, detergents, skin care products. No recent travel.   No else at home itchi they are doing over the next several weeks. Await clinical response to above therapy.       RTC as noted await pathology, consider systemic steroids if worsening symptoms      Orders Placed This Encounter      Cutaneous Direct IF, Biopsy      Specimen to P

## 2017-09-25 NOTE — TELEPHONE ENCOUNTER
Advised patient of Dr. Brandan Ortiz note below. Patient verbalized understanding. He stated his appt with Dr. Sheldon Smith for tomorrow is a hospital follow up and he does have an appt scheduled to see the ENT doctor.

## 2017-09-25 NOTE — TELEPHONE ENCOUNTER
No . I want to avoid  Overuse of abx and he has been having this problem for a while. he was sent to an ENT specialist not sure why coming back to me.

## 2017-09-25 NOTE — TELEPHONE ENCOUNTER
Action Requested: Summary for Provider     []  Critical Lab, Recommendations Needed  [x] Need Additional Advice  []   FYI    []   Need Orders  [] Need Medications Sent to Pharmacy  []  Other     SUMMARY: Patient with appt already scheduled to see Dr. Renard Woods

## 2017-09-25 NOTE — TELEPHONE ENCOUNTER
Transferred to a nurse triage encounter 9/25/17. From: Ruben Myrick  To: Umesh Jose DO  Sent: 9/24/2017  9:20 AM CDT  Subject: Non-Urgent Medical Question    My right ear is once again itching/burning. Also it is now hurting.  Can I resume the Ci

## 2017-09-26 ENCOUNTER — OFFICE VISIT (OUTPATIENT)
Dept: FAMILY MEDICINE CLINIC | Facility: CLINIC | Age: 82
End: 2017-09-26

## 2017-09-26 VITALS
WEIGHT: 186 LBS | HEART RATE: 63 BPM | TEMPERATURE: 98 F | SYSTOLIC BLOOD PRESSURE: 132 MMHG | DIASTOLIC BLOOD PRESSURE: 80 MMHG | BODY MASS INDEX: 27 KG/M2

## 2017-09-26 DIAGNOSIS — R42 DIZZINESS: Primary | ICD-10-CM

## 2017-09-26 PROCEDURE — 99495 TRANSJ CARE MGMT MOD F2F 14D: CPT | Performed by: FAMILY MEDICINE

## 2017-09-26 NOTE — PROGRESS NOTES
HPI:    Tevin Rios is a 80year old male here today for hospital follow up.    He was discharged from Inpatient hospital, [facility]  to [address] [place] [address] Date: 9/20/17  Discharge Date: 9/23/17  Hospital Discharge Diagnosis:    dizziness    Medicine (1,000 mcg total) by mouth daily. No current facility-administered medications on file prior to visit. HISTORY: reconciled and review with patient  He  has a past medical history of Arrhythmia; Atrial fibrillation ([facility] [address] (2004);  High blood pressur this encounter    Imaging & Consults:  None    .      Transitional Care Management Certification:  I certify that the following are true:  Communication with the patient was made within 2 business days of discharge on date above   Medical Decision Making- B

## 2017-09-27 ENCOUNTER — NURSE ONLY (OUTPATIENT)
Dept: DERMATOLOGY CLINIC | Facility: CLINIC | Age: 82
End: 2017-09-27

## 2017-09-27 DIAGNOSIS — Z48.02 VISIT FOR SUTURE REMOVAL: Primary | ICD-10-CM

## 2017-10-16 ENCOUNTER — TELEPHONE (OUTPATIENT)
Dept: CARDIOLOGY CLINIC | Facility: CLINIC | Age: 82
End: 2017-10-16

## 2017-10-16 DIAGNOSIS — I48.20 CHRONIC A-FIB (HCC): Primary | ICD-10-CM

## 2017-10-16 NOTE — TELEPHONE ENCOUNTER
Hi Dr. Maya Mederos,    Pt is due for new coumadin clinic order, please review and sign the pended order.

## 2017-10-18 ENCOUNTER — ANTI-COAG VISIT (OUTPATIENT)
Dept: INTERNAL MEDICINE CLINIC | Facility: CLINIC | Age: 82
End: 2017-10-18

## 2017-10-18 DIAGNOSIS — I48.20 CHRONIC ATRIAL FIBRILLATION (HCC): ICD-10-CM

## 2017-10-18 DIAGNOSIS — I48.20 CHRONIC A-FIB (HCC): ICD-10-CM

## 2017-10-26 ENCOUNTER — OFFICE VISIT (OUTPATIENT)
Dept: DERMATOLOGY CLINIC | Facility: CLINIC | Age: 82
End: 2017-10-26

## 2017-10-26 ENCOUNTER — ANTI-COAG VISIT (OUTPATIENT)
Dept: INTERNAL MEDICINE CLINIC | Facility: CLINIC | Age: 82
End: 2017-10-26

## 2017-10-26 ENCOUNTER — TELEPHONE (OUTPATIENT)
Dept: FAMILY MEDICINE CLINIC | Facility: CLINIC | Age: 82
End: 2017-10-26

## 2017-10-26 DIAGNOSIS — L25.9 CONTACT DERMATITIS AND ECZEMA: Primary | ICD-10-CM

## 2017-10-26 DIAGNOSIS — D23.9 BENIGN NEOPLASM OF SKIN, UNSPECIFIED LOCATION: ICD-10-CM

## 2017-10-26 DIAGNOSIS — L82.1 SEBORRHEIC KERATOSES: ICD-10-CM

## 2017-10-26 DIAGNOSIS — I48.20 CHRONIC ATRIAL FIBRILLATION (HCC): ICD-10-CM

## 2017-10-26 PROCEDURE — 85610 PROTHROMBIN TIME: CPT

## 2017-10-26 PROCEDURE — 99213 OFFICE O/P EST LOW 20 MIN: CPT | Performed by: DERMATOLOGY

## 2017-10-26 PROCEDURE — 36416 COLLJ CAPILLARY BLOOD SPEC: CPT

## 2017-10-26 PROCEDURE — G0463 HOSPITAL OUTPT CLINIC VISIT: HCPCS | Performed by: DERMATOLOGY

## 2017-10-26 PROCEDURE — G0463 HOSPITAL OUTPT CLINIC VISIT: HCPCS

## 2017-10-26 RX ORDER — WARFARIN SODIUM 5 MG/1
TABLET ORAL
Refills: 3 | COMMUNITY
Start: 2017-10-15 | End: 2017-12-04

## 2017-11-06 NOTE — PROGRESS NOTES
Luis Antonio Blunt is a 80year old male. Patient presents with:  Rash: \"Established pt\"- LOV- 9-14-17. Pt presents today for f/u with bilateral arms, groin, and upper legs. Pt notes is using clobetasol and great improvement.  Pt notes new spot of concern total) by mouth daily.  Disp: 90 tablet Rfl: 0     Allergies:     1-Methyl 2-Pyrrolid*        Comment:Other reaction(s): MINOCYCLINE HCL  Penicillins                 Comment:Other reaction(s): PENICILLIN G POTASSIUM    Past Medical History:   Diagnosis Date recent illnesses. ROS:   Denies any other systemic complaints. No fevers, chills, night sweats, photosensitivity, lymph node swelling. No other skin complaints.       Physical examination:  Well-developed well-nourished patient alert oriented in no ongoing symptoms discussed with patient and his wife    No orders of the defined types were placed in this encounter.       Meds & Refills for this Visit:     No prescriptions requested or ordered in this encounter           No orders of the defined types w

## 2017-11-09 ENCOUNTER — OFFICE VISIT (OUTPATIENT)
Dept: FAMILY MEDICINE CLINIC | Facility: CLINIC | Age: 82
End: 2017-11-09

## 2017-11-09 VITALS
SYSTOLIC BLOOD PRESSURE: 146 MMHG | TEMPERATURE: 97 F | HEIGHT: 70 IN | BODY MASS INDEX: 27.63 KG/M2 | HEART RATE: 57 BPM | WEIGHT: 193 LBS | DIASTOLIC BLOOD PRESSURE: 83 MMHG

## 2017-11-09 DIAGNOSIS — H92.03 OTALGIA OF BOTH EARS: Primary | ICD-10-CM

## 2017-11-09 DIAGNOSIS — H60.501 ACUTE OTITIS EXTERNA OF RIGHT EAR, UNSPECIFIED TYPE: ICD-10-CM

## 2017-11-09 PROCEDURE — 99213 OFFICE O/P EST LOW 20 MIN: CPT | Performed by: FAMILY MEDICINE

## 2017-11-09 PROCEDURE — G0463 HOSPITAL OUTPT CLINIC VISIT: HCPCS | Performed by: FAMILY MEDICINE

## 2017-11-09 RX ORDER — NEOMYCIN SULFATE, POLYMYXIN B SULFATE AND HYDROCORTISONE 10; 3.5; 1 MG/ML; MG/ML; [USP'U]/ML
3 SUSPENSION/ DROPS AURICULAR (OTIC) 4 TIMES DAILY
Qty: 1 BOTTLE | Refills: 1 | Status: SHIPPED | OUTPATIENT
Start: 2017-11-09 | End: 2017-11-14 | Stop reason: ALTCHOICE

## 2017-11-09 NOTE — PROGRESS NOTES
HPI:    Patient ID: Isa Eastman is a 80year old male. HPI  Patient presents with:  Ear Problem: right, itching. feels like previous ear inection. Review of Systems   Constitutional: Negative. HENT: Positive for ear pain.  Negative for ear disch

## 2017-11-10 ENCOUNTER — TELEPHONE (OUTPATIENT)
Dept: OTHER | Age: 82
End: 2017-11-10

## 2017-11-10 NOTE — TELEPHONE ENCOUNTER
Spouse calling back to states would like a call back to [phone] with MJS' response to message below. MJS please see below and advise.

## 2017-11-10 NOTE — TELEPHONE ENCOUNTER
Pt's wife Denise Anderson called(HIPPA signed) stating to disregard previous message. She was able to get drops into ear now.

## 2017-11-10 NOTE — TELEPHONE ENCOUNTER
Patient was seen yesterday by Dr. Laisha Agudelo for ear infection. When placing the drops into his ear today the wife stated that the drops are not going into the canal.    The wife stated that the ear is clogged. No appointment noted today or tomorrow.   Domenica

## 2017-11-11 ENCOUNTER — OFFICE VISIT (OUTPATIENT)
Dept: DERMATOLOGY CLINIC | Facility: CLINIC | Age: 82
End: 2017-11-11

## 2017-11-11 DIAGNOSIS — L30.9 DERMATITIS: ICD-10-CM

## 2017-11-11 DIAGNOSIS — R79.89 ABNORMAL CBC: Primary | ICD-10-CM

## 2017-11-11 DIAGNOSIS — L29.9 PRURITUS: ICD-10-CM

## 2017-11-11 PROCEDURE — 99213 OFFICE O/P EST LOW 20 MIN: CPT | Performed by: DERMATOLOGY

## 2017-11-11 PROCEDURE — G0463 HOSPITAL OUTPT CLINIC VISIT: HCPCS | Performed by: DERMATOLOGY

## 2017-11-11 RX ORDER — MONTELUKAST SODIUM 10 MG/1
10 TABLET ORAL NIGHTLY
Qty: 30 TABLET | Refills: 2 | Status: SHIPPED | OUTPATIENT
Start: 2017-11-11 | End: 2018-11-11

## 2017-11-11 NOTE — PROGRESS NOTES
Past Medical History:   Diagnosis Date   • Arrhythmia    • Atrial fibrillation ([facility] [address] 2004   • High blood pressure    • High cholesterol    • Hyperlipidemia    • Unspecified essential hypertension      Past Surgical History:  No date: HERNIA SURGERY    Soci

## 2017-11-13 ENCOUNTER — TELEPHONE (OUTPATIENT)
Dept: DERMATOLOGY CLINIC | Facility: CLINIC | Age: 82
End: 2017-11-13

## 2017-11-13 NOTE — TELEPHONE ENCOUNTER
S/w pt's spouse - she is concerned with pt taking singulair, because some of the s/e are: increased agitation, drawsiness, hallucinations and mood changes.  She states pt already has those affecting him, is there anything else he can have that does not have

## 2017-11-14 ENCOUNTER — TELEPHONE (OUTPATIENT)
Dept: OTHER | Age: 82
End: 2017-11-14

## 2017-11-14 ENCOUNTER — TELEPHONE (OUTPATIENT)
Dept: FAMILY MEDICINE CLINIC | Facility: CLINIC | Age: 82
End: 2017-11-14

## 2017-11-14 ENCOUNTER — OFFICE VISIT (OUTPATIENT)
Dept: FAMILY MEDICINE CLINIC | Facility: CLINIC | Age: 82
End: 2017-11-14

## 2017-11-14 VITALS
BODY MASS INDEX: 28 KG/M2 | WEIGHT: 193 LBS | DIASTOLIC BLOOD PRESSURE: 83 MMHG | HEART RATE: 67 BPM | SYSTOLIC BLOOD PRESSURE: 152 MMHG

## 2017-11-14 DIAGNOSIS — H60.501 ACUTE OTITIS EXTERNA OF RIGHT EAR, UNSPECIFIED TYPE: Primary | ICD-10-CM

## 2017-11-14 PROCEDURE — 99213 OFFICE O/P EST LOW 20 MIN: CPT | Performed by: FAMILY MEDICINE

## 2017-11-14 PROCEDURE — G0463 HOSPITAL OUTPT CLINIC VISIT: HCPCS | Performed by: FAMILY MEDICINE

## 2017-11-14 RX ORDER — CIPROFLOXACIN AND DEXAMETHASONE 3; 1 MG/ML; MG/ML
4 SUSPENSION/ DROPS AURICULAR (OTIC) 2 TIMES DAILY
Qty: 1 BOTTLE | Refills: 0 | Status: SHIPPED | OUTPATIENT
Start: 2017-11-14 | End: 2017-12-01

## 2017-11-14 NOTE — TELEPHONE ENCOUNTER
He could try claritin, loratidine 10mg or even the 5mg children's  Or allegra--this comes in 30mg children's dose may take at night , less sedating than zyrtec or benadryl.   The (generic) singulair typically causes less sedation than other antihistamines,

## 2017-11-14 NOTE — TELEPHONE ENCOUNTER
RN f/u 11/15    Pt  Wife is calling as pt was in to see  on Thursday and he believes he still has a ear infection as he still is having clear  ear discharge and ear pain and its become very itchy.  Pt is very uncomfortable and feels the ear infection

## 2017-11-14 NOTE — TELEPHONE ENCOUNTER
Patient called saying computer systems at regular pharmacy are down. Asked abx be called into another pharmacy, [address] Called in w/ pharmacist.     Pt wife notified.

## 2017-11-15 NOTE — TELEPHONE ENCOUNTER
Spouse states pt was seen in office as originally scheduled, did not need to go to ER. Nothing further needed at this time.

## 2017-11-15 NOTE — PROGRESS NOTES
HPI:    Patient ID: Twyla Shin is a 80year old male. HPI  Patient presents with:  Ear Pain: c/o right ear pain, ear is red and swollen    Review of Systems   Constitutional: Negative. HENT: Positive for ear pain.              Current Outpatient Pr Referrals:  None       YD#[id number]

## 2017-11-16 ENCOUNTER — LAB ENCOUNTER (OUTPATIENT)
Dept: LAB | Age: 82
End: 2017-11-16
Attending: DERMATOLOGY
Payer: MEDICARE

## 2017-11-16 ENCOUNTER — ANTI-COAG VISIT (OUTPATIENT)
Dept: INTERNAL MEDICINE CLINIC | Facility: CLINIC | Age: 82
End: 2017-11-16

## 2017-11-16 DIAGNOSIS — R79.89 ABNORMAL CBC: ICD-10-CM

## 2017-11-16 DIAGNOSIS — I48.20 CHRONIC ATRIAL FIBRILLATION (HCC): ICD-10-CM

## 2017-11-16 PROCEDURE — 36415 COLL VENOUS BLD VENIPUNCTURE: CPT

## 2017-11-16 PROCEDURE — 83540 ASSAY OF IRON: CPT

## 2017-11-16 PROCEDURE — 84466 ASSAY OF TRANSFERRIN: CPT

## 2017-11-16 PROCEDURE — G0463 HOSPITAL OUTPT CLINIC VISIT: HCPCS

## 2017-11-16 PROCEDURE — 82728 ASSAY OF FERRITIN: CPT

## 2017-11-16 PROCEDURE — 36416 COLLJ CAPILLARY BLOOD SPEC: CPT

## 2017-11-16 PROCEDURE — 85025 COMPLETE CBC W/AUTO DIFF WBC: CPT

## 2017-11-16 PROCEDURE — 85610 PROTHROMBIN TIME: CPT

## 2017-11-20 NOTE — PROGRESS NOTES
Gema Winters is a 80year old male.     Patient presents with:  Rash: pt here for 2 week f/u with concerns about new locations of small bumps that can itch            1-Methyl 2-Pyrrolidone; Penicillins    Current Outpatient Prescriptions:  Montelukast S TABLET (10 MG TOTAL) BY MOUTH ONCE DAILY. Disp: 90 tablet Rfl: 3   gabapentin 300 MG Oral Cap Take 1 capsule (300 mg total) by mouth 2 (two) times daily.  Disp: 60 capsule Rfl: 11   Vitamin B-12 1000 MCG Oral Tab Take 1 tablet (1,000 mcg total) by mouth mary breakfast.  Removes moves all the sugar drains can FU occasionally toast with jelly sugar-free. He has sandwich and pretzels for lunch. Same vegetables daily with dinner small amount of meat. Does get sun during the summer. Blood count had dropped.   Wi patient anxious regarding side effects. Anxious regarding side effects of antihistamines. Continue topical steroids. Consider phototherapy if new lesions develop. Meds in grid. Skin care instructions reviewed.   The use various products including moist

## 2017-11-25 ENCOUNTER — OFFICE VISIT (OUTPATIENT)
Dept: FAMILY MEDICINE CLINIC | Facility: CLINIC | Age: 82
End: 2017-11-25

## 2017-11-25 VITALS
DIASTOLIC BLOOD PRESSURE: 79 MMHG | SYSTOLIC BLOOD PRESSURE: 136 MMHG | TEMPERATURE: 98 F | HEART RATE: 64 BPM | BODY MASS INDEX: 28 KG/M2 | WEIGHT: 193 LBS

## 2017-11-25 DIAGNOSIS — H60.391 OTHER INFECTIVE CHRONIC OTITIS EXTERNA OF RIGHT EAR: Primary | ICD-10-CM

## 2017-11-25 PROCEDURE — G0463 HOSPITAL OUTPT CLINIC VISIT: HCPCS | Performed by: FAMILY MEDICINE

## 2017-11-25 PROCEDURE — 99213 OFFICE O/P EST LOW 20 MIN: CPT | Performed by: FAMILY MEDICINE

## 2017-11-25 NOTE — PROGRESS NOTES
HPI:    Patient ID: Isa Eastman is a 80year old male. HPI  Patient presents with:  Ear Pain: c/o ear pain and itching  Not very much improvement. Review of Systems   Constitutional: Negative. HENT: Positive for ear pain and hearing loss.     Resp encounter    Imaging & Referrals:  None       #[id number]

## 2017-12-01 ENCOUNTER — ANTI-COAG VISIT (OUTPATIENT)
Dept: INTERNAL MEDICINE CLINIC | Facility: CLINIC | Age: 82
End: 2017-12-01

## 2017-12-01 DIAGNOSIS — I48.20 CHRONIC ATRIAL FIBRILLATION (HCC): ICD-10-CM

## 2017-12-01 PROCEDURE — 36416 COLLJ CAPILLARY BLOOD SPEC: CPT

## 2017-12-01 PROCEDURE — 85610 PROTHROMBIN TIME: CPT

## 2017-12-01 PROCEDURE — 99211 OFF/OP EST MAY X REQ PHY/QHP: CPT

## 2017-12-01 RX ORDER — ACETIC ACID 20.65 MG/ML
4 SOLUTION AURICULAR (OTIC) 3 TIMES DAILY
COMMUNITY
End: 2019-01-08 | Stop reason: ALTCHOICE

## 2017-12-01 RX ORDER — LORATADINE 10 MG/1
10 TABLET ORAL DAILY
COMMUNITY

## 2017-12-04 ENCOUNTER — OFFICE VISIT (OUTPATIENT)
Dept: FAMILY MEDICINE CLINIC | Facility: CLINIC | Age: 82
End: 2017-12-04

## 2017-12-04 VITALS
WEIGHT: 193 LBS | HEART RATE: 58 BPM | DIASTOLIC BLOOD PRESSURE: 82 MMHG | BODY MASS INDEX: 28 KG/M2 | SYSTOLIC BLOOD PRESSURE: 132 MMHG

## 2017-12-04 DIAGNOSIS — I48.20 CHRONIC ATRIAL FIBRILLATION (HCC): Primary | ICD-10-CM

## 2017-12-04 PROCEDURE — G0463 HOSPITAL OUTPT CLINIC VISIT: HCPCS | Performed by: FAMILY MEDICINE

## 2017-12-04 PROCEDURE — 99213 OFFICE O/P EST LOW 20 MIN: CPT | Performed by: FAMILY MEDICINE

## 2017-12-04 RX ORDER — WARFARIN SODIUM 5 MG/1
TABLET ORAL
Qty: 60 TABLET | Refills: 3 | Status: SHIPPED | OUTPATIENT
Start: 2017-12-04 | End: 2018-03-01

## 2017-12-04 NOTE — PROGRESS NOTES
HPI:    Patient ID: Jeff Arango is a 80year old male. HPI  Patient presents with:  Medication Problem: pt would like to discuss Warfarin medication and INR/PT results. Review of Systems   Constitutional: Negative. Respiratory: Negative.     Card Visit:  Signed Prescriptions Disp Refills    COUMADIN 5 MG Oral Tab 60 tablet 3      Sig: TAKE two tabs daily or as directed by coumadin clinic.            Imaging & Referrals:  None       #[id number]

## 2017-12-15 ENCOUNTER — ANTI-COAG VISIT (OUTPATIENT)
Dept: INTERNAL MEDICINE CLINIC | Facility: CLINIC | Age: 82
End: 2017-12-15

## 2017-12-15 DIAGNOSIS — I48.20 CHRONIC ATRIAL FIBRILLATION (HCC): ICD-10-CM

## 2017-12-15 PROCEDURE — 36416 COLLJ CAPILLARY BLOOD SPEC: CPT

## 2017-12-15 PROCEDURE — 85610 PROTHROMBIN TIME: CPT

## 2017-12-15 RX ORDER — WARFARIN SODIUM 5 MG/1
TABLET ORAL
Qty: 45 TABLET | Refills: 3 | OUTPATIENT
Start: 2017-12-15

## 2018-01-12 ENCOUNTER — ANTI-COAG VISIT (OUTPATIENT)
Dept: INTERNAL MEDICINE CLINIC | Facility: CLINIC | Age: 83
End: 2018-01-12

## 2018-01-12 DIAGNOSIS — I48.20 CHRONIC ATRIAL FIBRILLATION (HCC): ICD-10-CM

## 2018-01-12 LAB — INR: 2.6 (ref 2–3)

## 2018-01-12 PROCEDURE — 85610 PROTHROMBIN TIME: CPT

## 2018-01-12 PROCEDURE — 36416 COLLJ CAPILLARY BLOOD SPEC: CPT

## 2018-01-22 ENCOUNTER — OFFICE VISIT (OUTPATIENT)
Dept: NEUROLOGY | Facility: CLINIC | Age: 83
End: 2018-01-22

## 2018-01-22 VITALS
RESPIRATION RATE: 16 BRPM | HEIGHT: 70.5 IN | DIASTOLIC BLOOD PRESSURE: 60 MMHG | SYSTOLIC BLOOD PRESSURE: 108 MMHG | WEIGHT: 189 LBS | BODY MASS INDEX: 26.76 KG/M2 | HEART RATE: 53 BPM | OXYGEN SATURATION: 99 %

## 2018-01-22 DIAGNOSIS — R42 VERTIGO: Primary | ICD-10-CM

## 2018-01-22 DIAGNOSIS — H92.03 OTALGIA OF BOTH EARS: ICD-10-CM

## 2018-01-22 PROCEDURE — 99213 OFFICE O/P EST LOW 20 MIN: CPT | Performed by: OTHER

## 2018-01-22 RX ORDER — GABAPENTIN 300 MG/1
300 CAPSULE ORAL 2 TIMES DAILY
Qty: 180 CAPSULE | Refills: 3 | Status: SHIPPED | OUTPATIENT
Start: 2018-01-22 | End: 2018-04-22

## 2018-01-22 NOTE — PROGRESS NOTES
Mika Stanton : 6/3/1934     HPI:   Patient presents with:  Dizziness: Patient is here for a 6 month f/u for dizziness and vertigo. Per pt he is doing much better now and states he R ear is much better now.  He mentioned he feels on the edge when he wake Disp: 60 g Rfl: 2     No current facility-administered medications for this visit.     Past Medical History:   Diagnosis Date   • Arrhythmia    • Atrial fibrillation ([facility] [address] 2004   • High blood pressure    • High cholesterol    • Hyperlipidemia    • Unspecifi Cuff Size: adult)   Pulse 53   Resp 16   Ht 70.5\"   Wt 189 lb   SpO2 99%   BMI 26.74 kg/m²    General appearance: In no distress  CV: No  Evidence of Carotid Bruits, Regular Rate and Rhythm.    Neuro:  Higher Integrative Functions:  Alert and cooperative,

## 2018-02-12 ENCOUNTER — ANTI-COAG VISIT (OUTPATIENT)
Dept: INTERNAL MEDICINE CLINIC | Facility: CLINIC | Age: 83
End: 2018-02-12

## 2018-02-12 DIAGNOSIS — I48.20 CHRONIC ATRIAL FIBRILLATION (HCC): ICD-10-CM

## 2018-02-12 LAB — INR: 2.8 (ref 2–3)

## 2018-02-12 PROCEDURE — 36416 COLLJ CAPILLARY BLOOD SPEC: CPT

## 2018-02-12 PROCEDURE — 85610 PROTHROMBIN TIME: CPT

## 2018-03-01 RX ORDER — WARFARIN SODIUM 5 MG/1
TABLET ORAL
Qty: 60 TABLET | Refills: 3 | Status: SHIPPED | OUTPATIENT
Start: 2018-03-01

## 2018-03-01 NOTE — TELEPHONE ENCOUNTER
Requesting Warfarin refill    Last filled 12/4/17  Last INR 2.8 on 2/12/18    Med pended for review, please advise

## 2018-03-01 NOTE — TELEPHONE ENCOUNTER
Per pharmacy on file pt needs refill on Warfarin and wheever the send erx they received bubble error, Pt stts that needs 90 days supply. Pls advise.       Current Outpatient Prescriptions:        COUMADIN 5 MG Oral Tab TAKE two tabs daily or as directed by

## 2018-03-12 ENCOUNTER — ANTI-COAG VISIT (OUTPATIENT)
Dept: INTERNAL MEDICINE CLINIC | Facility: CLINIC | Age: 83
End: 2018-03-12

## 2018-03-12 ENCOUNTER — TELEPHONE (OUTPATIENT)
Dept: FAMILY MEDICINE CLINIC | Facility: CLINIC | Age: 83
End: 2018-03-12

## 2018-03-12 DIAGNOSIS — I48.20 CHRONIC ATRIAL FIBRILLATION (HCC): ICD-10-CM

## 2018-03-12 LAB — INR: 4.4 (ref 2–3)

## 2018-03-12 PROCEDURE — 85610 PROTHROMBIN TIME: CPT

## 2018-03-12 PROCEDURE — 36416 COLLJ CAPILLARY BLOOD SPEC: CPT

## 2018-03-12 PROCEDURE — G0463 HOSPITAL OUTPT CLINIC VISIT: HCPCS

## 2018-03-23 ENCOUNTER — ANTI-COAG VISIT (OUTPATIENT)
Dept: INTERNAL MEDICINE CLINIC | Facility: CLINIC | Age: 83
End: 2018-03-23

## 2018-03-23 DIAGNOSIS — I48.20 CHRONIC ATRIAL FIBRILLATION (HCC): ICD-10-CM

## 2018-03-23 LAB — INR: 1.6 (ref 2–3)

## 2018-03-23 PROCEDURE — G0463 HOSPITAL OUTPT CLINIC VISIT: HCPCS

## 2018-03-23 PROCEDURE — 85610 PROTHROMBIN TIME: CPT

## 2018-03-23 PROCEDURE — 36416 COLLJ CAPILLARY BLOOD SPEC: CPT

## 2018-04-05 ENCOUNTER — TELEPHONE (OUTPATIENT)
Dept: NEUROLOGY | Facility: CLINIC | Age: 83
End: 2018-04-05

## 2018-04-06 ENCOUNTER — ANTI-COAG VISIT (OUTPATIENT)
Dept: INTERNAL MEDICINE CLINIC | Facility: CLINIC | Age: 83
End: 2018-04-06

## 2018-04-06 DIAGNOSIS — I48.20 CHRONIC ATRIAL FIBRILLATION (HCC): ICD-10-CM

## 2018-04-06 PROCEDURE — G0463 HOSPITAL OUTPT CLINIC VISIT: HCPCS

## 2018-04-06 PROCEDURE — 85610 PROTHROMBIN TIME: CPT

## 2018-04-06 PROCEDURE — 36416 COLLJ CAPILLARY BLOOD SPEC: CPT

## 2018-04-20 ENCOUNTER — ANTI-COAG VISIT (OUTPATIENT)
Dept: INTERNAL MEDICINE CLINIC | Facility: CLINIC | Age: 83
End: 2018-04-20

## 2018-04-20 DIAGNOSIS — I48.20 CHRONIC ATRIAL FIBRILLATION (HCC): ICD-10-CM

## 2018-04-20 PROCEDURE — 85610 PROTHROMBIN TIME: CPT

## 2018-04-20 PROCEDURE — 36416 COLLJ CAPILLARY BLOOD SPEC: CPT

## 2018-05-17 ENCOUNTER — ANTI-COAG VISIT (OUTPATIENT)
Dept: INTERNAL MEDICINE CLINIC | Facility: CLINIC | Age: 83
End: 2018-05-17

## 2018-05-17 DIAGNOSIS — I48.20 CHRONIC ATRIAL FIBRILLATION (HCC): ICD-10-CM

## 2018-05-17 PROCEDURE — 36416 COLLJ CAPILLARY BLOOD SPEC: CPT

## 2018-05-17 PROCEDURE — 85610 PROTHROMBIN TIME: CPT

## 2018-06-01 ENCOUNTER — PATIENT OUTREACH (OUTPATIENT)
Dept: CASE MANAGEMENT | Age: 83
End: 2018-06-01

## 2018-06-01 NOTE — PROGRESS NOTES
Outreached to patient in regards to enrollment to Chronic Care Management program. Not able to leave a message, phone rings and rings then goes to a busy tone. I will try at a later time. Thank you.

## 2018-06-14 ENCOUNTER — ANTI-COAG VISIT (OUTPATIENT)
Dept: INTERNAL MEDICINE CLINIC | Facility: CLINIC | Age: 83
End: 2018-06-14

## 2018-06-14 DIAGNOSIS — I48.20 CHRONIC ATRIAL FIBRILLATION (HCC): ICD-10-CM

## 2018-06-14 PROCEDURE — G0463 HOSPITAL OUTPT CLINIC VISIT: HCPCS

## 2018-06-14 PROCEDURE — 85610 PROTHROMBIN TIME: CPT

## 2018-06-14 PROCEDURE — 36416 COLLJ CAPILLARY BLOOD SPEC: CPT

## 2018-06-25 ENCOUNTER — ANTI-COAG VISIT (OUTPATIENT)
Dept: INTERNAL MEDICINE CLINIC | Facility: CLINIC | Age: 83
End: 2018-06-25

## 2018-06-25 ENCOUNTER — TELEPHONE (OUTPATIENT)
Dept: FAMILY MEDICINE CLINIC | Facility: CLINIC | Age: 83
End: 2018-06-25

## 2018-06-25 DIAGNOSIS — I48.20 CHRONIC ATRIAL FIBRILLATION (HCC): ICD-10-CM

## 2018-06-25 NOTE — TELEPHONE ENCOUNTER
Spouse asking for a call back. Has questions regarding pt's coumadin dose, pt missed last night dose.

## 2018-07-17 PROBLEM — Z12.83 SKIN CANCER SCREENING: Status: ACTIVE | Noted: 2018-07-17

## 2018-10-17 PROBLEM — H81.12 BENIGN PAROXYSMAL POSITIONAL VERTIGO OF LEFT EAR: Status: ACTIVE | Noted: 2018-10-17

## 2018-10-17 PROBLEM — I87.2 VENOUS INSUFFICIENCY: Status: ACTIVE | Noted: 2018-10-17

## 2018-11-30 PROBLEM — Z92.89 HOSPITALIZATION WITHIN LAST 30 DAYS: Status: ACTIVE | Noted: 2018-11-30

## 2018-11-30 PROBLEM — I67.1 BRAIN ANEURYSM: Status: ACTIVE | Noted: 2018-11-30

## 2019-01-08 PROBLEM — L03.115 CELLULITIS OF RIGHT LOWER EXTREMITY: Status: ACTIVE | Noted: 2019-01-08

## 2019-02-05 PROBLEM — R60.0 EDEMA OF RIGHT FOOT: Status: ACTIVE | Noted: 2019-02-05

## 2019-02-05 PROBLEM — L03.115 CELLULITIS OF RIGHT LOWER EXTREMITY: Status: RESOLVED | Noted: 2019-01-08 | Resolved: 2019-02-05

## 2019-03-01 PROBLEM — L30.9 DERMATITIS: Status: ACTIVE | Noted: 2019-03-01

## 2019-07-13 PROBLEM — L03.032: Status: ACTIVE | Noted: 2019-07-13

## 2019-07-13 PROBLEM — L60.0 ONYCHOCRYPTOSIS: Status: ACTIVE | Noted: 2019-07-13

## 2019-09-06 PROBLEM — Z28.21 INFLUENZA VACCINE REFUSED: Status: ACTIVE | Noted: 2019-09-06

## 2019-09-06 PROBLEM — L03.116 CELLULITIS OF LEFT LOWER EXTREMITY: Status: ACTIVE | Noted: 2019-09-06

## 2019-09-13 PROBLEM — Z28.21 INFLUENZA VACCINATION DECLINED: Status: ACTIVE | Noted: 2019-09-13

## 2019-11-15 PROBLEM — L03.032: Status: RESOLVED | Noted: 2019-07-13 | Resolved: 2019-11-15

## 2019-11-15 PROBLEM — R73.03 PREDIABETES: Status: ACTIVE | Noted: 2019-11-15

## 2019-11-15 PROBLEM — L03.116 CELLULITIS OF LEFT LOWER EXTREMITY: Status: RESOLVED | Noted: 2019-09-06 | Resolved: 2019-11-15

## 2019-11-15 PROBLEM — B35.1 ONYCHOMYCOSIS: Status: ACTIVE | Noted: 2019-11-15

## 2020-02-17 PROBLEM — D69.6 TEMPORARY LOW PLATELET COUNT (HCC): Status: ACTIVE | Noted: 2020-02-17

## 2020-02-17 PROBLEM — I73.89 OTHER SPECIFIED PERIPHERAL VASCULAR DISEASES (HCC): Status: ACTIVE | Noted: 2020-02-17

## 2020-04-14 PROBLEM — I67.1 BRAIN ANEURYSM: Status: RESOLVED | Noted: 2018-11-30 | Resolved: 2020-04-14

## 2020-04-14 PROBLEM — H92.01 RIGHT EAR PAIN: Status: RESOLVED | Noted: 2017-09-20 | Resolved: 2020-04-14

## 2020-04-14 PROBLEM — I67.1 ANEURYSM OF MIDDLE CEREBRAL ARTERY: Status: ACTIVE | Noted: 2018-11-12

## 2020-04-14 PROBLEM — G45.9 TRANSIENT CEREBRAL ISCHEMIA, UNSPECIFIED TYPE: Status: RESOLVED | Noted: 2017-06-07 | Resolved: 2020-04-14
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
## 2020-04-14 PROBLEM — M62.838 MUSCLE SPASM: Status: ACTIVE | Noted: 2020-04-14

## 2020-04-14 PROBLEM — S00.03XA CONTUSION OF SCALP, INITIAL ENCOUNTER: Status: RESOLVED | Noted: 2017-09-20 | Resolved: 2020-04-14

## 2020-04-14 PROBLEM — L30.9 DERMATITIS: Status: RESOLVED | Noted: 2019-03-01 | Resolved: 2020-04-14

## 2020-04-14 PROBLEM — S09.90XA INJURY OF HEAD, INITIAL ENCOUNTER: Status: RESOLVED | Noted: 2017-09-20 | Resolved: 2020-04-14

## 2020-04-14 PROBLEM — R42 DIZZINESS: Status: RESOLVED | Noted: 2017-06-07 | Resolved: 2020-04-14

## 2020-04-14 PROBLEM — R41.3 MEMORY DYSFUNCTION: Status: ACTIVE | Noted: 2020-04-14

## 2020-04-14 PROBLEM — H81.12 BENIGN PAROXYSMAL POSITIONAL VERTIGO OF LEFT EAR: Status: RESOLVED | Noted: 2018-10-17 | Resolved: 2020-04-14

## 2020-04-14 PROBLEM — R60.0 EDEMA OF RIGHT FOOT: Status: RESOLVED | Noted: 2019-02-05 | Resolved: 2020-04-14

## 2020-12-22 PROBLEM — H11.32 SUBCONJUNCTIVAL HEMORRHAGE OF LEFT EYE: Status: ACTIVE | Noted: 2020-12-22

## 2021-03-08 DIAGNOSIS — Z23 NEED FOR VACCINATION: ICD-10-CM

## 2021-04-23 NOTE — TELEPHONE ENCOUNTER
Instructed pt to take 5 mg on that day.   lina
Pt's wife called in requesting pt's dosage instructions of coumadin for 3/22 only.
with patient

## 2021-09-21 PROBLEM — M25.561 ACUTE PAIN OF RIGHT KNEE: Status: ACTIVE | Noted: 2021-09-21

## 2021-09-21 PROBLEM — M79.661 RIGHT CALF PAIN: Status: ACTIVE | Noted: 2021-09-21

## 2021-10-05 PROBLEM — B36.9 FUNGAL DERMATITIS: Status: ACTIVE | Noted: 2021-10-05

## 2022-01-03 PROBLEM — I70.202 PERONEAL ARTERY OCCLUSION, LEFT (HCC): Status: ACTIVE | Noted: 2022-01-03

## 2024-06-13 NOTE — TELEPHONE ENCOUNTER
Appointment scheduled with PCP 6/27
Please schedule patient for a hospital follow up appointment with pcp . Pt was discharged on 6/8/17.
English

## (undated) NOTE — Clinical Note
Tarsha Cervantes  [address] [facility]       06/03/2017        Patient: Papa Oleary   YOB: 1934   Date of Visit: 6/3/2017       Dear  Dr. Mio Goldsmith, DO,      Thank you for referring Papa Oleary to my practice.   Please f

## (undated) NOTE — LETTER
Papa Oleary  6/3/1934    A patient of your clinic was recently seen by the hospitalists at [facility], and will be following up with the outpt lab on Saturday 9/23/17.     The patient's last INR values were, as follows:  Lab Results  [place]

## (undated) NOTE — MR AVS SNAPSHOT
[address] 50 [address]  [phone]               Thank you for choosing us for your health care visit with Singh Singletary PT.   We are glad to serve you and happy to provide you with t

## (undated) NOTE — MR AVS SNAPSHOT
Elisa  [address]  [phone]               Thank you for choosing us for your health care visit with Maris Rosenthal MD.  We are glad to serve you and happy to provide you with this summary This list is accurate as of: 6/3/17 12:17 PM.  Always use your most recent med list.                atorvastatin 10 MG Tabs   Take 1 tablet (10 mg total) by mouth once daily.    Commonly known as:  LIPITOR           Betamethasone Dipropionate Aug 0.05 % Oin

## (undated) NOTE — LETTER
[address], [address] [facility]  [address] [facility]  [phone]        Dear Jd Church, DO,      I had the pleasure of seeing your patient, Benito Johnson on 1/22/2018.      Below ple Vitamin B-12 1000 MCG Oral Tab Take 1 tablet (1,000 mcg total) by mouth daily. Disp: 90 tablet Rfl: 0   acetic acid 2 % Otic Solution Place 4 drops into the right ear 3 (three) times daily.  Disp:  Rfl:    Montelukast Sodium (SINGULAIR) 10 MG Oral Tab Take RESPIRATORY: denies shortness of breath, wheezing or cough   CARDIOVASCULAR: denies chest pain or WILSON; no palpitations   GI: denies nausea, vomiting, constipation, diarrhea; no heartburn  GENITAL/: no dysuria, urgency or frequency; no nocturia  MUSCULOSK the external canal on the right today, and he is planning on revisiting ENT. He was instructed to call me if any new neurologic symptoms develop. Thank you very much. Return if symptoms worsen or fail to improve. Urszula Langston.  Mariam Henry MD

## (undated) NOTE — MR AVS SNAPSHOT
[facility] [place]  [address] [address]  [phone]               Thank you for choosing us for your health care visit with Buffy Valencia MD.  We are glad to serve you and happy to provide you with this summary of y clinic staff will provide you with the phone number you should call to schedule your appointment. [address] [facility]   [address]  [address] [facility], [address] [facility]    [address] [facility]   [address]   [facility]    If Commonly known as:  ULTRACET           Warfarin Sodium 5 MG Tabs   TAKE AS DIRECTED BY ANTICOAGULATION CLINIC: 1.5 tabs daily PO in Evening   Commonly known as:  COUMADIN                   MyChart               Educational Information     Healthy Diet and

## (undated) NOTE — LETTER
No referring provider defined for this encounter. 09/07/17        Patient: Isa Eastman   YOB: 1934   Date of Visit: 9/7/2017       Dear  Dr. Acacia Murillo,      Thank you for referring Isa Eastman to my practice.   Please find my

## (undated) NOTE — MR AVS SNAPSHOT
[facility] [place] [address] [place]  [address]  [phone]               Thank you for choosing us for your health care visit with Michael Sweeney DO.   We are glad to serve you and happy to provide you with this summary of Follow-up Instructions     Return in about 3 months (around 9/6/2017). Scheduling Instructions     Tuesday June 06, 2017     Imaging:  US CAROTID DOPPLER BILAT - DIAG IMG ([id number])    Instructions:   To schedule a test at any [facility] Take 1 tablet (10 mg total) by mouth once daily. Commonly known as:  LIPITOR           Betamethasone Dipropionate Aug 0.05 % Oint   Apply by topical route every day to the affected area(s); do not exceed 45 grams per week.    Commonly known as:  Elisabeth Ochoa

## (undated) NOTE — ED AVS SNAPSHOT
Mary Perez   MRN: [id number]    Department:  [facility] Emergency Department   Date of Visit:  8/31/2017           Disclosure     Insurance plans vary and the physician(s) referred by the ER may not be covered by your plan.  Please contact yo CARE PHYSICIAN AT ONCE OR RETURN IMMEDIATELY TO THE EMERGENCY DEPARTMENT. If you have been prescribed any medication(s), please fill your prescription right away and begin taking the medication(s) as directed.   If you believe that any of the medications

## (undated) NOTE — MR AVS SNAPSHOT
[facility]"" - [place] [address] [address] [phone]               Thank you for choosing us for your health care visit with Sherry Ghosh MD.  We are glad to serve you and happy to provide you with this summary of y TAKE 1 AND 1/2 TABLET BY MOUTH IN THE EVEING           DiltiaZEM HCl ER Beads 180 MG Cp24   Take 1 capsule (180 mg total) by mouth daily.  daily   Commonly known as:  [address] [facility]                  Visit [facility] online at

## (undated) NOTE — LETTER
[address] [facility]  [address], [address] [place] [address] [phone]        Dear Shwetha Hall, DO,      I had the pleasure of seeing your patient, Mary Perez on 7/11/2017.      Below please find a summ gabapentin 300 MG Oral Cap Take 1 capsule (300 mg total) by mouth 2 (two) times daily. Disp: 60 capsule Rfl: 11   WARFARIN SODIUM 7.5 MG Oral Tab Take 7.5 mg by mouth As Directed.  Sunday, Monday, TUES, WED, THURS & FRIDAY Disp:  Rfl:    Warfarin Sodium 5 M SKIN: denies any unusual skin lesions or rashes  EYES: no visual complaints or deficits  HEENT: Bilateral hearing loss  RESPIRATORY: denies shortness of breath, wheezing or cough   CARDIOVASCULAR: History of atrial fibrillation  GI: denies nausea, vomiting very well. I renewed his gabapentin, and suggested a follow-up examination in 6 months to rule out any new signs on exam.  He will call if any new symptoms develop. Thank you very much. Return in about 6 months (around 1/11/2018). Florentino Shukla.  Ashwin Pelletier

## (undated) NOTE — MR AVS SNAPSHOT
[address]  [phone]               Thank you for choosing us for your health care visit with Tracey Gregg.  Cassie Moreland MD.  We are glad to serve you and happy to provide you with this summar Commonly known as:  LIPITOR           Betamethasone Dipropionate Aug 0.05 % Oint   Apply by topical route every day to the affected area(s); do not exceed 45 grams per week.    Commonly known as:  DIPROLENE-AF           Clindamycin HCl 150 MG Caps   Commonl

## (undated) NOTE — ED AVS SNAPSHOT
[facility] Emergency Department    [address]     [place] [address]    Phone:  [phone]    Fax:  [phone]           Holley Flowers   MRN: [id number]    Department:  [facility] Emergency Department   Date of Visit:  6/5/20 and Class Registration line at [phone] or find a doctor online by visiting [email or web address].    IF THERE IS ANY CHANGE OR WORSENING OF YOUR CONDITION, CALL YOUR PRIMARY CARE PHYSICIAN AT ONCE OR RETURN IMMEDIATELY TO [address] [place] [address].     If

## (undated) NOTE — Clinical Note
TCM outreach complete. Pt will be having INR drawn tomorrow at outpt lab. Please proceed with TCM follow up as scheduled on 9/26/17.

## (undated) NOTE — LETTER
Hospital Discharge Documentation  Please phone to schedule a hospital follow up appointment.     From: [address] Hospitalist's Office  Phone: [phone]    Patient discharged time/date: 6/14/2017  7:27 PM  Patient discharge disposition:  Home or Self Neuro:  nonfocal      Reason for Admission: Fall    History of Present Illness: Aubree Hernandez is a(n) 80year old male with a history of vertigo and a-fib and a history of many nonspecific complaints who presents to ER today with a fall.  Pt was about to g Discharge Condition: Good    Discharge Medications:      Discharge Medications      START taking these medications       Instructions Prescription details    Meclizine HCl 25 MG Chew        Chew 1 tablet by mouth 3 (three) times daily as needed (dizziness/ (Take 7.5mg on Sunday, Monday, Tuesday, Wednesday, Thursday, Friday)        Take 10 mg by mouth As Directed.  ON SUNDAYS ONLY    Refills:  0       STOP taking these medications          DilTIAZem HCl ER Beads 180 MG Cp24   Commonly known as:  [employer]

## (undated) NOTE — LETTER
Emma Sánchez  6/3/1934    A patient of your clinic was recently seen by the hospitalists at [facility], and will be following up with you.     The patient's last INR values were, as follows:    Date INR Value Comments   6/14/17 2.2 Received

## (undated) NOTE — IP AVS SNAPSHOT
[address]  [address] [facility] [phone]                Discharge Summary   6/13/2017    Azam Patricia           Admission Information        Provider Department    6/13/2017 Hollis Mckeon MD [facility] [address]                           gabapentin 300 MG Caps   Last time this was given:  300 mg on 6/14/2017  7:56 AM   Commonly known as:  NEURONTIN   Next dose due: Tonight        Take 1 capsule (300 mg total) by mouth 2 (two) times daily.     Alexx Ferris Follow-up Information     Follow up with Catina Espinosa DO. Schedule an appointment as soon as possible for a visit in 2 days.     Specialty:  [facility] information:    [address] [place]  [phone]          Follow up wit Immunization History as of 6/14/2017  Reviewed on 6/13/2017    No immunizations on file.       Recent Hematology Lab Results  (Last 3 results in the past 90 days)    WBC RBC Hemoglobin Hematocrit MCV MCH MCHC RDW Platelet MPV    (35/55/42)  5.2 (06/14/17) Medications Sent Home None to return    Medications Returned:           Additional Information       We are concerned for your overall well being:    - If you are a smoker or have smoked in the last 12 months, we encourage you to explore options for quitti Warfarin Sodium 10 MG Oral Tab       Use: Prevent the development or progression of blood clots   Most common side effects: Abnormal bleeding   What to report to your healthcare team: Bruising, blood in urine or stool, or nosebleeds             Blood Prod

## (undated) NOTE — ED AVS SNAPSHOT
Azam Patricia   MRN: [id number]    Department:  [facility] Emergency Department   Date of Visit:  9/4/2017           Disclosure     Insurance plans vary and the physician(s) referred by the ER may not be covered by your plan.  Please contact you CARE PHYSICIAN AT ONCE OR RETURN IMMEDIATELY TO THE EMERGENCY DEPARTMENT. If you have been prescribed any medication(s), please fill your prescription right away and begin taking the medication(s) as directed.   If you believe that any of the medications

## (undated) NOTE — MR AVS SNAPSHOT
[address] [employer]  [phone]               Thank you for choosing us for your health care visit with Karen Christian PT.   We are glad to serve you and happy to provide you with t [address] [facility] ([address] [place] [address])    [address] [address]   [phone]           Please arrive at your scheduled appointment time. Wear comfortable, loose fitting clothing.               Neelt

## (undated) NOTE — ED AVS SNAPSHOT
[facility] Emergency Department    [address]     [place] [address]    Phone:  [phone]    Fax:  [phone]           Analia Mccarthy   MRN: [id number]    Department:  [facility] Emergency Department   Date of Visit:  6/5/20 covered by your plan. Please contact your insurance company to determine coverage and benefits available for follow-up care and referrals.       If you have difficulty scheduling your follow-up appointment as directed, please call our  at [phone]) If you believe that any of the medications or instructions on this list is different from what your Primary Care doctor has instructed you - please continue to take your medications as instructed by your Primary Care doctor until you can check with your do coverage. Patient [address] is a Federal Navigator program that can help with your Affordable Care Act coverage, as well as all types of Medicaid plans.   To get signed up and covered, please call [phone] and ask to get set up for an insuran

## (undated) NOTE — MR AVS SNAPSHOT
[address]  [facility] [address]  [phone]  [phone]               Thank you for choosing us for your health care visit with Finn Altamirano PT.   We are glad to serve you and happy to provide you wit Please arrive at your scheduled appointment time. Wear comfortable, loose fitting clothing.               MyChart

## (undated) NOTE — IP AVS SNAPSHOT
[address]  [address] [facility] [phone]                Discharge Summary   6/7/2017    [facility]           Admission Information        Provider Department    6/7/2017 Latonya Camarena MD [facility] 3w/Sw Take 1 tablet (10 mg total) by mouth once daily. Karen Mann                           DilTIAZem HCl ER Beads 180 MG Cp24   Commonly known as:  TIAZAC   Next dose due:  6/9/2017 - 9am        Take 1 capsule (180 mg total) by mouth daily.  daily Future Appointments     Jun 08, 2017  4:45 PM   US CAROTID DOPPLER with Dixon Souza PM   [facility] ([address])    [address]  [address] [facility] [address]   [phone]            Jun 27, 2017 11:30 AM 0.1 (06/07/17)  0.1      Metabolic Lab Results  (Last result in the past 90 days)    HgbA1C Glucose BUN Creatinine Calcium Alkaline Phosph AST    -- (06/07/17)  101 (H) (06/07/17)  19 (06/07/17)  0.94 (06/07/17)  9.6 -- --      Metabolic Lab Results  (Last experience these side effects or respond to medications the same. Please call your provider or healthcare team if you have any questions regarding your medications while at home.          Blood Pressure and Cardiac Medications     DiltiaZEM HCl ER Beads 180